# Patient Record
Sex: MALE | Race: WHITE | Employment: FULL TIME | URBAN - METROPOLITAN AREA
[De-identification: names, ages, dates, MRNs, and addresses within clinical notes are randomized per-mention and may not be internally consistent; named-entity substitution may affect disease eponyms.]

---

## 2019-01-16 ENCOUNTER — HOSPITAL ENCOUNTER (EMERGENCY)
Age: 49
Discharge: HOME | End: 2019-01-16
Payer: COMMERCIAL

## 2019-01-16 ENCOUNTER — APPOINTMENT (OUTPATIENT)
Dept: GENERAL RADIOLOGY | Age: 49
End: 2019-01-16
Payer: COMMERCIAL

## 2019-01-16 VITALS
WEIGHT: 192 LBS | OXYGEN SATURATION: 96 % | DIASTOLIC BLOOD PRESSURE: 66 MMHG | HEART RATE: 64 BPM | BODY MASS INDEX: 30.13 KG/M2 | TEMPERATURE: 98.1 F | RESPIRATION RATE: 14 BRPM | SYSTOLIC BLOOD PRESSURE: 127 MMHG | HEIGHT: 67 IN

## 2019-01-16 DIAGNOSIS — S40.021A ARM CONTUSION, RIGHT, INITIAL ENCOUNTER: Primary | ICD-10-CM

## 2019-01-16 PROCEDURE — 73090 X-RAY EXAM OF FOREARM: CPT

## 2019-01-16 PROCEDURE — 73110 X-RAY EXAM OF WRIST: CPT

## 2019-01-16 PROCEDURE — 99283 EMERGENCY DEPT VISIT LOW MDM: CPT

## 2019-01-16 RX ORDER — IBUPROFEN 800 MG/1
800 TABLET ORAL EVERY 8 HOURS PRN
Qty: 12 TABLET | Refills: 0 | Status: SHIPPED | OUTPATIENT
Start: 2019-01-16

## 2019-01-16 ASSESSMENT — PAIN DESCRIPTION - ORIENTATION: ORIENTATION: RIGHT

## 2019-01-16 ASSESSMENT — PAIN DESCRIPTION - PROGRESSION: CLINICAL_PROGRESSION: NOT CHANGED

## 2019-01-16 ASSESSMENT — PAIN DESCRIPTION - FREQUENCY: FREQUENCY: CONTINUOUS

## 2019-01-16 ASSESSMENT — PAIN DESCRIPTION - DESCRIPTORS: DESCRIPTORS: PATIENT UNABLE TO DESCRIBE

## 2019-01-16 ASSESSMENT — PAIN SCALES - GENERAL: PAINLEVEL_OUTOF10: 5

## 2019-01-16 ASSESSMENT — PAIN DESCRIPTION - LOCATION: LOCATION: ARM

## 2019-01-16 ASSESSMENT — PAIN DESCRIPTION - PAIN TYPE: TYPE: ACUTE PAIN

## 2022-12-05 ENCOUNTER — HOSPITAL ENCOUNTER (INPATIENT)
Age: 52
LOS: 4 days | Discharge: HOME | DRG: 513 | End: 2022-12-09
Attending: EMERGENCY MEDICINE | Admitting: INTERNAL MEDICINE
Payer: COMMERCIAL

## 2022-12-05 ENCOUNTER — APPOINTMENT (OUTPATIENT)
Dept: GENERAL RADIOLOGY | Age: 52
DRG: 513 | End: 2022-12-05
Payer: COMMERCIAL

## 2022-12-05 DIAGNOSIS — S61.452A DOG BITE OF LEFT HAND, INITIAL ENCOUNTER: ICD-10-CM

## 2022-12-05 DIAGNOSIS — S62.329B: ICD-10-CM
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
## 2022-12-05 DIAGNOSIS — L03.114 CELLULITIS OF LEFT UPPER EXTREMITY: ICD-10-CM

## 2022-12-05 DIAGNOSIS — L03.114 CELLULITIS OF LEFT HAND: ICD-10-CM

## 2022-12-05 PROBLEM — L03.90 CELLULITIS: Status: ACTIVE | Noted: 2022-12-05

## 2022-12-05 LAB
ANION GAP SERPL CALCULATED.3IONS-SCNC: 8 MMOL/L (ref 7–16)
BASOPHILS ABSOLUTE: 0.04 E9/L (ref 0–0.2)
BASOPHILS RELATIVE PERCENT: 0.3 % (ref 0–2)
BUN BLDV-MCNC: 17 MG/DL (ref 6–20)
CALCIUM SERPL-MCNC: 9.5 MG/DL (ref 8.6–10.2)
CHLORIDE BLD-SCNC: 103 MMOL/L (ref 98–107)
CO2: 28 MMOL/L (ref 22–29)
CREAT SERPL-MCNC: 1 MG/DL (ref 0.7–1.2)
EOSINOPHILS ABSOLUTE: 0.02 E9/L (ref 0.05–0.5)
EOSINOPHILS RELATIVE PERCENT: 0.2 % (ref 0–6)
GFR SERPL CREATININE-BSD FRML MDRD: >60 ML/MIN/1.73
GLUCOSE BLD-MCNC: 123 MG/DL (ref 74–99)
HCT VFR BLD CALC: 41.5 % (ref 37–54)
HEMOGLOBIN: 14.7 G/DL (ref 12.5–16.5)
IMMATURE GRANULOCYTES #: 0.05 E9/L
IMMATURE GRANULOCYTES %: 0.4 % (ref 0–5)
LYMPHOCYTES ABSOLUTE: 1.99 E9/L (ref 1.5–4)
LYMPHOCYTES RELATIVE PERCENT: 15.1 % (ref 20–42)
MCH RBC QN AUTO: 31.4 PG (ref 26–35)
MCHC RBC AUTO-ENTMCNC: 35.4 % (ref 32–34.5)
MCV RBC AUTO: 88.7 FL (ref 80–99.9)
MONOCYTES ABSOLUTE: 1.24 E9/L (ref 0.1–0.95)
MONOCYTES RELATIVE PERCENT: 9.4 % (ref 2–12)
NEUTROPHILS ABSOLUTE: 9.88 E9/L (ref 1.8–7.3)
NEUTROPHILS RELATIVE PERCENT: 74.6 % (ref 43–80)
PDW BLD-RTO: 11.9 FL (ref 11.5–15)
PLATELET # BLD: 292 E9/L (ref 130–450)
PMV BLD AUTO: 9.1 FL (ref 7–12)
POTASSIUM SERPL-SCNC: 4.1 MMOL/L (ref 3.5–5)
RBC # BLD: 4.68 E12/L (ref 3.8–5.8)
SODIUM BLD-SCNC: 139 MMOL/L (ref 132–146)
WBC # BLD: 13.2 E9/L (ref 4.5–11.5)

## 2022-12-05 PROCEDURE — 1200000000 HC SEMI PRIVATE

## 2022-12-05 PROCEDURE — 2580000003 HC RX 258: Performed by: FAMILY MEDICINE

## 2022-12-05 PROCEDURE — 73130 X-RAY EXAM OF HAND: CPT

## 2022-12-05 PROCEDURE — 90471 IMMUNIZATION ADMIN: CPT | Performed by: NURSE PRACTITIONER

## 2022-12-05 PROCEDURE — 80048 BASIC METABOLIC PNL TOTAL CA: CPT

## 2022-12-05 PROCEDURE — 90714 TD VACC NO PRESV 7 YRS+ IM: CPT | Performed by: NURSE PRACTITIONER

## 2022-12-05 PROCEDURE — 99285 EMERGENCY DEPT VISIT HI MDM: CPT

## 2022-12-05 PROCEDURE — 6360000002 HC RX W HCPCS: Performed by: NURSE PRACTITIONER

## 2022-12-05 PROCEDURE — 2580000003 HC RX 258: Performed by: NURSE PRACTITIONER

## 2022-12-05 PROCEDURE — 85025 COMPLETE CBC W/AUTO DIFF WBC: CPT

## 2022-12-05 PROCEDURE — 6360000002 HC RX W HCPCS: Performed by: FAMILY MEDICINE

## 2022-12-05 PROCEDURE — 96365 THER/PROPH/DIAG IV INF INIT: CPT

## 2022-12-05 PROCEDURE — 96375 TX/PRO/DX INJ NEW DRUG ADDON: CPT

## 2022-12-05 RX ORDER — ENOXAPARIN SODIUM 100 MG/ML
40 INJECTION SUBCUTANEOUS DAILY
Status: DISCONTINUED | OUTPATIENT
Start: 2022-12-05 | End: 2022-12-09 | Stop reason: HOSPADM

## 2022-12-05 RX ORDER — ACETAMINOPHEN 650 MG
TABLET, EXTENDED RELEASE ORAL ONCE
Status: COMPLETED | OUTPATIENT
Start: 2022-12-05 | End: 2022-12-05

## 2022-12-05 RX ORDER — ACETAMINOPHEN 650 MG/1
650 SUPPOSITORY RECTAL EVERY 6 HOURS PRN
Status: DISCONTINUED | OUTPATIENT
Start: 2022-12-05 | End: 2022-12-09 | Stop reason: HOSPADM

## 2022-12-05 RX ORDER — SODIUM CHLORIDE 9 MG/ML
INJECTION, SOLUTION INTRAVENOUS PRN
Status: DISCONTINUED | OUTPATIENT
Start: 2022-12-05 | End: 2022-12-08 | Stop reason: SDUPTHER

## 2022-12-05 RX ORDER — KETOROLAC TROMETHAMINE 30 MG/ML
15 INJECTION, SOLUTION INTRAMUSCULAR; INTRAVENOUS ONCE
Status: COMPLETED | OUTPATIENT
Start: 2022-12-05 | End: 2022-12-05

## 2022-12-05 RX ORDER — SODIUM CHLORIDE 9 MG/ML
INJECTION, SOLUTION INTRAVENOUS CONTINUOUS
Status: DISCONTINUED | OUTPATIENT
Start: 2022-12-05 | End: 2022-12-08

## 2022-12-05 RX ORDER — ONDANSETRON 4 MG/1
4 TABLET, ORALLY DISINTEGRATING ORAL EVERY 8 HOURS PRN
Status: DISCONTINUED | OUTPATIENT
Start: 2022-12-05 | End: 2022-12-09 | Stop reason: HOSPADM

## 2022-12-05 RX ORDER — ACETAMINOPHEN 325 MG/1
650 TABLET ORAL EVERY 6 HOURS PRN
Status: DISCONTINUED | OUTPATIENT
Start: 2022-12-05 | End: 2022-12-09 | Stop reason: HOSPADM

## 2022-12-05 RX ORDER — ONDANSETRON 2 MG/ML
4 INJECTION INTRAMUSCULAR; INTRAVENOUS EVERY 6 HOURS PRN
Status: DISCONTINUED | OUTPATIENT
Start: 2022-12-05 | End: 2022-12-09 | Stop reason: HOSPADM

## 2022-12-05 RX ORDER — IBUPROFEN 200 MG
200 TABLET ORAL EVERY 6 HOURS PRN
COMMUNITY

## 2022-12-05 RX ORDER — IBUPROFEN 800 MG/1
800 TABLET ORAL EVERY 8 HOURS PRN
Status: DISCONTINUED | OUTPATIENT
Start: 2022-12-05 | End: 2022-12-09 | Stop reason: HOSPADM

## 2022-12-05 RX ORDER — SODIUM CHLORIDE 0.9 % (FLUSH) 0.9 %
10 SYRINGE (ML) INJECTION EVERY 12 HOURS SCHEDULED
Status: DISCONTINUED | OUTPATIENT
Start: 2022-12-05 | End: 2022-12-09 | Stop reason: HOSPADM

## 2022-12-05 RX ORDER — TETANUS AND DIPHTHERIA TOXOIDS ADSORBED 2; 2 [LF]/.5ML; [LF]/.5ML
0.5 INJECTION INTRAMUSCULAR ONCE
Status: COMPLETED | OUTPATIENT
Start: 2022-12-05 | End: 2022-12-05

## 2022-12-05 RX ORDER — SODIUM CHLORIDE 0.9 % (FLUSH) 0.9 %
10 SYRINGE (ML) INJECTION PRN
Status: DISCONTINUED | OUTPATIENT
Start: 2022-12-05 | End: 2022-12-09 | Stop reason: HOSPADM

## 2022-12-05 RX ADMIN — SODIUM CHLORIDE 3000 MG: 900 INJECTION INTRAVENOUS at 20:30

## 2022-12-05 RX ADMIN — AMPICILLIN AND SULBACTAM 3000 MG: 1; 2 INJECTION, POWDER, FOR SOLUTION INTRAMUSCULAR; INTRAVENOUS at 13:24

## 2022-12-05 RX ADMIN — KETOROLAC TROMETHAMINE 15 MG: 30 INJECTION, SOLUTION INTRAMUSCULAR; INTRAVENOUS at 13:12

## 2022-12-05 RX ADMIN — SODIUM CHLORIDE: 9 INJECTION, SOLUTION INTRAVENOUS at 18:16

## 2022-12-05 RX ADMIN — SODIUM CHLORIDE, PRESERVATIVE FREE 10 ML: 5 INJECTION INTRAVENOUS at 20:48

## 2022-12-05 RX ADMIN — Medication: at 13:11

## 2022-12-05 RX ADMIN — TETANUS AND DIPHTHERIA TOXOIDS ADSORBED 0.5 ML: 2; 2 INJECTION INTRAMUSCULAR at 14:04

## 2022-12-05 ASSESSMENT — PAIN SCALES - GENERAL
PAINLEVEL_OUTOF10: 4
PAINLEVEL_OUTOF10: 0

## 2022-12-05 ASSESSMENT — PAIN - FUNCTIONAL ASSESSMENT: PAIN_FUNCTIONAL_ASSESSMENT: 0-10

## 2022-12-05 NOTE — ED PROVIDER NOTES
ED Attending shared visit  CC: No       Department of Emergency Medicine   ED  Encounter Note  Admit Date/RoomTime: 2022 12:42 PM  ED Room: 36/36  NAME: Lizett Louise  : 1970  MRN: [id number]     Chief Complaint:  Animal Bite (Left hand on Saturday. Not owner of dog, seen at urgent care and sent in for IV abx.)    HISTORY OF PRESENT ILLNESS        Lizett Louise is a 50 y.o. male who presents to the ED arrived via private vehicle with significant other for injury sustained to the left hand after being by dog 2 days ago. The owner is known and Department of Health has been contacted. Dog is up-to-date on rabies. Patient is unaware of his last tetanus. He has no history of diabetes. He has developed progressively worsening swelling and erythema of the left hand. He holds his fingers in a flexed position. The redness and edema is extending to the distal portion of the forearm. ROS   Pertinent positives and negatives are stated within HPI, all other systems reviewed and are negative. Past Medical History:  has no past medical history on file. Surgical History:  has a past surgical history that includes Biceps tendon repair. Social History:  reports that he has never smoked. He has never used smokeless tobacco. He reports current alcohol use. He reports that he does not use drugs. Family History: family history is not on file. Allergies: Patient has no known allergies. PHYSICAL EXAM   Oxygen Saturation Interpretation: Normal on room air analysis. ED Triage Vitals   BP Temp Temp src Heart Rate Resp SpO2 Height Weight   22 1222 22 1222 -- 22 1139 22 1139 22 1139 22 1139 22 1139   (!) 153/93 99 °F (37.2 °C)  (!) 108 18 99 % 5' 7\" (1.702 m) 192 lb (87.1 kg)         Physical Exam  General: Awake alert and oriented. Well-appearing. Nontoxic. HEENT: Normocephalic, atraumatic.   Pupils equal  Neck: Normal range of motion  Cardiac: Regular rate  Respiratory: Respirations even, unlabored. No respiratory distress  Abdomen: Nondistended  Musculoskelatal: Specifically the left hand is grossly edematous in all of the fingers and the dorsum with associated erythema. Erythema and edema extending to the distal forearm. He is holding his fingers in a flexed position unable to straighten. He is a puncture wound over the ulnar aspect of the third metacarpal head as well as 1 to the dorsum of the hand.   Neuro: Nonlateralizing  Skin: Flesh tone, warm, dry  Psych: Normal affect  Lab / Imaging Results   (All laboratory and radiology results have been personally reviewed by myself)  Labs:  Results for orders placed or performed during the hospital encounter of 12/05/22   CBC with Auto Differential   Result Value Ref Range    WBC 13.2 (H) 4.5 - 11.5 E9/L    RBC 4.68 3.80 - 5.80 E12/L    Hemoglobin 14.7 12.5 - 16.5 g/dL    Hematocrit 41.5 37.0 - 54.0 %    MCV 88.7 80.0 - 99.9 fL    MCH 31.4 26.0 - 35.0 pg    MCHC 35.4 (H) 32.0 - 34.5 %    RDW 11.9 11.5 - 15.0 fL    Platelets 407 331 - 354 E9/L    MPV 9.1 7.0 - 12.0 fL    Neutrophils % 74.6 43.0 - 80.0 %    Immature Granulocytes % 0.4 0.0 - 5.0 %    Lymphocytes % 15.1 (L) 20.0 - 42.0 %    Monocytes % 9.4 2.0 - 12.0 %    Eosinophils % 0.2 0.0 - 6.0 %    Basophils % 0.3 0.0 - 2.0 %    Neutrophils Absolute 9.88 (H) 1.80 - 7.30 E9/L    Immature Granulocytes # 0.05 E9/L    Lymphocytes Absolute 1.99 1.50 - 4.00 E9/L    Monocytes Absolute 1.24 (H) 0.10 - 0.95 E9/L    Eosinophils Absolute 0.02 (L) 0.05 - 0.50 E9/L    Basophils Absolute 0.04 0.00 - 0.20 T5/O   Basic Metabolic Panel   Result Value Ref Range    Sodium 139 132 - 146 mmol/L    Potassium 4.1 3.5 - 5.0 mmol/L    Chloride 103 98 - 107 mmol/L    CO2 28 22 - 29 mmol/L    Anion Gap 8 7 - 16 mmol/L    Glucose 123 (H) 74 - 99 mg/dL    BUN 17 6 - 20 mg/dL    Creatinine 1.0 0.7 - 1.2 mg/dL    Est, Glom Filt Rate >60 >=60 mL/min/1.73    Calcium 9.5 8.6 - 10.2 mg/dL Imaging: All Radiology results interpreted by Radiologist unless otherwise noted. XR HAND LEFT (MIN 3 VIEWS)   Final Result   Small avulsion fragment adjacent to the 3rd metacarpal head. Soft tissue   swelling. Osteoarthritis. ED Course / Medical Decision Making     Medications   ampicillin-sulbactam (UNASYN) 3,000 mg in sodium chloride 0.9 % 100 mL IVPB (mini-bag) (0 mg IntraVENous Stopped 12/5/22 1429)   diptheria-tetanus toxoids (DECAVAC) 2-2 LF/0.5ML injection 0.5 mL (0.5 mLs IntraMUSCular Given 12/5/22 1404)   povidone-iodine (BETADINE) 10 % external solution ( Topical Given 12/5/22 1311)   ketorolac (TORADOL) injection 15 mg (15 mg IntraVENous Given 12/5/22 1312)     ED Course as of 12/05/22 1447   Mon Dec 05, 2022   1442 Discussed with Jazmin Red of [facility], will admit to Dr. Jann Gosselin [LV]      ED Course User Index  [LV] VITA Wilson CNP     Re-examination:  12/5/22       Time: 1430  Patients condition remains stable. Consult(s):   IP CONSULT TO HOSPITALIST    Procedure(s):   None    MDM:   74-year-old male patient presenting to the emergency department with a puncture wound sustained from a dog bite approximately 48 hours ago. It does appear to be cellulitic. There is an avulsion fracture on the x-ray. This will require inpatient IV antibiotics. Plan of Care/Counseling:  VITA Wilson CNP reviewed today's visit with the patient in addition to providing specific details for the plan of care and counseling regarding the diagnosis and prognosis. Questions are answered at this time and are agreeable with the plan. ASSESSMENT     1. Dog bite, initial encounter    2. Cellulitis of left upper extremity    3. Open avulsion fracture of shaft of metacarpal bone, initial encounter      PLAN   Admit to General Medical Floor.   Patient condition is stable    New Medications     New Prescriptions    No medications on file     Electronically signed by VITA Wilson CNP   DD: 12/5/22  **This report was transcribed using voice recognition software. Every effort was made to ensure accuracy; however, inadvertent computerized transcription errors may be present.   END OF ED PROVIDER NOTE      Jorge Khalil, VITA - BLANKA  12/05/22 [id number]

## 2022-12-06 ENCOUNTER — ANESTHESIA EVENT (OUTPATIENT)
Dept: OPERATING ROOM | Age: 52
End: 2022-12-06
Payer: COMMERCIAL

## 2022-12-06 PROBLEM — S61.452A DOG BITE OF LEFT HAND: Status: ACTIVE | Noted: 2022-12-03
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
## 2022-12-06 LAB
ANION GAP SERPL CALCULATED.3IONS-SCNC: 10 MMOL/L (ref 7–16)
BASOPHILS ABSOLUTE: 0.02 E9/L (ref 0–0.2)
BASOPHILS RELATIVE PERCENT: 0.2 % (ref 0–2)
BUN BLDV-MCNC: 16 MG/DL (ref 6–20)
CALCIUM SERPL-MCNC: 8.5 MG/DL (ref 8.6–10.2)
CHLORIDE BLD-SCNC: 102 MMOL/L (ref 98–107)
CO2: 23 MMOL/L (ref 22–29)
CREAT SERPL-MCNC: 1 MG/DL (ref 0.7–1.2)
EOSINOPHILS ABSOLUTE: 0.03 E9/L (ref 0.05–0.5)
EOSINOPHILS RELATIVE PERCENT: 0.3 % (ref 0–6)
GFR SERPL CREATININE-BSD FRML MDRD: >60 ML/MIN/1.73
GLUCOSE BLD-MCNC: 113 MG/DL (ref 74–99)
HCT VFR BLD CALC: 37.1 % (ref 37–54)
HEMOGLOBIN: 12.9 G/DL (ref 12.5–16.5)
IMMATURE GRANULOCYTES #: 0.02 E9/L
IMMATURE GRANULOCYTES %: 0.2 % (ref 0–5)
LACTIC ACID: 0.7 MMOL/L (ref 0.5–2.2)
LYMPHOCYTES ABSOLUTE: 1.5 E9/L (ref 1.5–4)
LYMPHOCYTES RELATIVE PERCENT: 15.6 % (ref 20–42)
MCH RBC QN AUTO: 30.8 PG (ref 26–35)
MCHC RBC AUTO-ENTMCNC: 34.8 % (ref 32–34.5)
MCV RBC AUTO: 88.5 FL (ref 80–99.9)
MONOCYTES ABSOLUTE: 0.84 E9/L (ref 0.1–0.95)
MONOCYTES RELATIVE PERCENT: 8.8 % (ref 2–12)
NEUTROPHILS ABSOLUTE: 7.19 E9/L (ref 1.8–7.3)
NEUTROPHILS RELATIVE PERCENT: 74.9 % (ref 43–80)
PDW BLD-RTO: 11.7 FL (ref 11.5–15)
PLATELET # BLD: 267 E9/L (ref 130–450)
PMV BLD AUTO: 9.1 FL (ref 7–12)
POTASSIUM REFLEX MAGNESIUM: 3.6 MMOL/L (ref 3.5–5)
PROCALCITONIN: 0.11 NG/ML (ref 0–0.08)
RBC # BLD: 4.19 E12/L (ref 3.8–5.8)
SEDIMENTATION RATE, ERYTHROCYTE: 24 MM/HR (ref 0–15)
SODIUM BLD-SCNC: 135 MMOL/L (ref 132–146)
WBC # BLD: 9.6 E9/L (ref 4.5–11.5)

## 2022-12-06 PROCEDURE — 1200000000 HC SEMI PRIVATE

## 2022-12-06 PROCEDURE — 6370000000 HC RX 637 (ALT 250 FOR IP): Performed by: INTERNAL MEDICINE

## 2022-12-06 PROCEDURE — 84145 PROCALCITONIN (PCT): CPT

## 2022-12-06 PROCEDURE — 99254 IP/OBS CNSLTJ NEW/EST MOD 60: CPT | Performed by: ORTHOPAEDIC SURGERY

## 2022-12-06 PROCEDURE — 85651 RBC SED RATE NONAUTOMATED: CPT

## 2022-12-06 PROCEDURE — 83605 ASSAY OF LACTIC ACID: CPT

## 2022-12-06 PROCEDURE — 85025 COMPLETE CBC W/AUTO DIFF WBC: CPT

## 2022-12-06 PROCEDURE — 2580000003 HC RX 258: Performed by: FAMILY MEDICINE

## 2022-12-06 PROCEDURE — 84100 ASSAY OF PHOSPHORUS: CPT

## 2022-12-06 PROCEDURE — 86140 C-REACTIVE PROTEIN: CPT

## 2022-12-06 PROCEDURE — 80048 BASIC METABOLIC PNL TOTAL CA: CPT

## 2022-12-06 PROCEDURE — 83735 ASSAY OF MAGNESIUM: CPT

## 2022-12-06 PROCEDURE — 36415 COLL VENOUS BLD VENIPUNCTURE: CPT

## 2022-12-06 PROCEDURE — 6360000002 HC RX W HCPCS: Performed by: FAMILY MEDICINE

## 2022-12-06 RX ORDER — PANTOPRAZOLE SODIUM 40 MG/1
40 TABLET, DELAYED RELEASE ORAL
Status: DISCONTINUED | OUTPATIENT
Start: 2022-12-07 | End: 2022-12-09 | Stop reason: HOSPADM

## 2022-12-06 RX ORDER — HYDROCODONE BITARTRATE AND ACETAMINOPHEN 5; 325 MG/1; MG/1
1 TABLET ORAL EVERY 4 HOURS PRN
Status: DISCONTINUED | OUTPATIENT
Start: 2022-12-06 | End: 2022-12-07

## 2022-12-06 RX ORDER — POTASSIUM CHLORIDE 7.45 MG/ML
10 INJECTION INTRAVENOUS PRN
Status: DISCONTINUED | OUTPATIENT
Start: 2022-12-06 | End: 2022-12-09 | Stop reason: HOSPADM

## 2022-12-06 RX ORDER — POTASSIUM CHLORIDE 20 MEQ/1
40 TABLET, EXTENDED RELEASE ORAL PRN
Status: DISCONTINUED | OUTPATIENT
Start: 2022-12-06 | End: 2022-12-09 | Stop reason: HOSPADM

## 2022-12-06 RX ADMIN — SODIUM CHLORIDE 3000 MG: 900 INJECTION INTRAVENOUS at 02:28

## 2022-12-06 RX ADMIN — SODIUM CHLORIDE 3000 MG: 900 INJECTION INTRAVENOUS at 14:00

## 2022-12-06 RX ADMIN — SODIUM CHLORIDE: 9 INJECTION, SOLUTION INTRAVENOUS at 08:31

## 2022-12-06 RX ADMIN — SODIUM CHLORIDE 3000 MG: 900 INJECTION INTRAVENOUS at 08:34

## 2022-12-06 RX ADMIN — HYDROCODONE BITARTRATE AND ACETAMINOPHEN 1 TABLET: 5; 325 TABLET ORAL at 20:15

## 2022-12-06 RX ADMIN — SODIUM CHLORIDE 3000 MG: 900 INJECTION INTRAVENOUS at 20:14

## 2022-12-06 ASSESSMENT — PAIN SCALES - GENERAL
PAINLEVEL_OUTOF10: 0
PAINLEVEL_OUTOF10: 5
PAINLEVEL_OUTOF10: 5

## 2022-12-06 ASSESSMENT — PAIN DESCRIPTION - LOCATION: LOCATION: HAND

## 2022-12-06 ASSESSMENT — LIFESTYLE VARIABLES: SMOKING_STATUS: 1

## 2022-12-06 ASSESSMENT — PAIN - FUNCTIONAL ASSESSMENT: PAIN_FUNCTIONAL_ASSESSMENT: ACTIVITIES ARE NOT PREVENTED

## 2022-12-06 ASSESSMENT — PAIN DESCRIPTION - DESCRIPTORS: DESCRIPTORS: ACHING;DISCOMFORT;SORE

## 2022-12-06 ASSESSMENT — PAIN DESCRIPTION - ORIENTATION: ORIENTATION: LEFT

## 2022-12-06 NOTE — CONSULTS
[address] [place] [address] [facility]    Consultation Note     Admit Date: 12/5/2022 12:42 PM    Reason for Consult:   dog bite    Attending Physician:  Jerrod Hidalgo DO     Chief Complaint: dog bite. HISTORY OF PRESENT ILLNESS:   The patient is a 50 y.o.  male known to the Infectious Diseases service. The patient admitted with dog bite of left hand 2 days ago. And he developed progressive redness and swelling. Had chills and fevers so he came to the hospital. No cough or SOB or diarrhea or abdominal pain. He does smoke cigars and drink alcohol. He is  for 15 yrs. Admission patient is afebrile hemodynamically stable saturating well on room air. Labs showed he had leukocytosis yesterday of 13.2 improved 9.6 today otherwise hemoglobin and platelet counts are normal his renal function tests are normal procalcitonin is 0.11. ESR is 24. X-ray left hand showed  Impression   Small avulsion fragment adjacent to the 3rd metacarpal head. Soft tissue   swelling. Osteoarthritis. No blood cultures were drawn patient is on IV Unasyn 3 g every 6 and I got consult further recommendations.     Past Medical History:        Diagnosis Date    Cellulitis 12/05/2022    Dog bite of left hand 12/03/2022     Past Surgical History:        Procedure Laterality Date    BICEPS TENDON REPAIR       Current Medications:   Scheduled Meds:   [START ON 12/7/2022] pantoprazole  40 mg Oral QAM AC    sodium chloride flush  10 mL IntraVENous 2 times per day    enoxaparin  40 mg SubCUTAneous Daily    ampicillin-sulbactam  3,000 mg IntraVENous Q6H     Continuous Infusions:   sodium chloride 75 mL/hr at 12/06/22 0831    sodium chloride       PRN Meds:HYDROcodone 5 mg - acetaminophen, potassium chloride **OR** potassium alternative oral replacement **OR** potassium chloride, ibuprofen, sodium chloride flush, sodium chloride, ondansetron **OR** ondansetron, magnesium hydroxide, acetaminophen **OR** acetaminophen    Allergies:  Patient has no known allergies. Social History:   Social History     Socioeconomic History    Marital status:      Spouse name: None    Number of children: None    Years of education: None    Highest education level: None   Tobacco Use    Smoking status: Some Days     Types: Cigars    Smokeless tobacco: Never   Vaping Use    Vaping Use: Never used   Substance and Sexual Activity    Alcohol use: Yes    Drug use: No     Family History:       Problem Relation Age of Onset    Other Mother          age 59 cerebral aneurysm    Other Father         Alive , severe arthritis prior alcoholic   . Otherwise non-pertinent to the chief complaint. REVIEW OF SYSTEMS:    As mentioned in HPI, all other systems negative. PHYSICAL EXAM:    Vitals:    /68   Pulse 92   Temp 99.7 °F (37.6 °C) (Oral)   Resp 16   Ht 5' 7\" (1.702 m)   Wt 194 lb (88 kg)   SpO2 94%   BMI 30.38 kg/m²   Constitutional: The patient is awake, alert, and oriented. Skin: Warm and dry. No rashes were noted. No jaundice. HEENT: Eyes show round, and reactive pupils. Moist mucous membranes, no ulcerations, no thrush. Neck: Supple to movements. No lymphadenopathy. Chest: No use of accessory muscles to breathe. Symmetrical expansion. Auscultation reveals no wheezing, crackles, or rhonchi. Cardiovascular: S1 and S2 are rhythmic and regular. No murmurs appreciated. Abdomen: Positive bowel sounds to auscultation. Benign to palpation. No masses felt. No hepatosplenomegaly. Extremities: No clubbing, no cyanosis, no edema. Musculoskeletal: left hand cellulitis and swelling till almost to the elbow. Swelling and purulent drainage most pronounced at the left 3rd MTP joint.    Neurological: alert, oriented x 3  Lines: peripheral      CBC+dif:  Recent Labs     22  1256 22  0230   WBC 13.2* 9.6   HGB 14.7 12.9   HCT 41.5 37.1   MCV 88.7 88.5    267   NEUTROABS 9.88* 7.19     No results found for: CRP  No results found for: CRPHS  Lab Results   Component Value Date    SEDRATE 24 (H) 12/06/2022     No results found for: ALT, AST, GGT, ALKPHOS, BILITOT  Lab Results   Component Value Date/Time     12/06/2022 02:30 AM    K 3.6 12/06/2022 02:30 AM     12/06/2022 02:30 AM    CO2 23 12/06/2022 02:30 AM    BUN 16 12/06/2022 02:30 AM    CREATININE 1.0 12/06/2022 02:30 AM    LABGLOM >60 12/06/2022 02:30 AM    GLUCOSE 113 12/06/2022 02:30 AM    CALCIUM 8.5 12/06/2022 02:30 AM       No results found for: PROTIME, INR    No results found for: TSH    No results found for: NITRITE, COLORU, PHUR, LABCAST, WBCUA, RBCUA, MUCUS, TRICHOMONAS, YEAST, BACTERIA, CLARITYU, SPECGRAV, LEUKOCYTESUR, UROBILINOGEN, BILIRUBINUR, BLOODU, GLUCOSEU, AMORPHOUS    No results found for: YEK4ALB, BEART, K1DBTGRN, PHART, THGBART, OID7VYH, PO2ART, FVS8ZQG  Radiology:  XR HAND LEFT (MIN 3 VIEWS)   Final Result   Small avulsion fragment adjacent to the 3rd metacarpal head. Soft tissue   swelling. Osteoarthritis. Microbiology:  Pending  No results for input(s): BC in the last 72 hours. No results for input(s): ORG in the last 72 hours. No results for input(s): Barnetta Alexx in the last 72 hours. No results for input(s): STREPNEUMAGU in the last 72 hours. No results for input(s): LP1UAG in the last 72 hours. No results for input(s): ASO in the last 72 hours. No results for input(s): CULTRESP in the last 72 hours. Assessment:  Left hand cellulitis from dog bite: pending OR     Plan:    Cont IV Unasyn 3gr q 6  Check cultures  Baseline ESR, CRP  Monitor labs  Will follow with you    Thank you for having us see this patient in consultation. I will be discussing this case with the treating physicians.       Electronically signed by Zach Sanabria MD on 12/6/2022 at 3:22 PM

## 2022-12-06 NOTE — H&P
History and Physical      CHIEF COMPLAINT: Animal bite left hand    History of Present Illness: 53-year-old male patient of Dr. Gladis Bean III I am asked to admit and follow. History obtained from patient, wife was present. Presented to the ED due to pain and swelling of his left hand. He states he had a dog bite that occurred 12/3/2022 to his left hand. The dog owner is known; Department of Health has been contacted. Dog is up-to-date on rabies. Patient developed progressive worsening of swelling and erythema of the left hand, holds fingers in a flexed position. --Past medical history is negative for rheumatic fever scarlet fever polio diphtheria cancer diabetes hypertension  --Patient smokes occasional cigar  --Patient's mother  age 59 cerebral aneurysm; father alive age 68 severe arthritis prior alcoholic  --Denies any previous pneumonia but does get bronchitis every 3 to 4 years. No history of asthma no shortness of breath. Denies any palpitations chest pain. Denies any recurring indigestion heartburn gas blood in stool blood in urine kidney stones kidney infections. Past Medical History:   Diagnosis Date    Cellulitis 2022         Past Surgical History:   Procedure Laterality Date    BICEPS TENDON REPAIR         Medications Prior to Admission:    Medications Prior to Admission: ibuprofen (ADVIL;MOTRIN) 200 MG tablet, Take 200 mg by mouth every 6 hours as needed for Pain    Allergies:    Patient has no known allergies. Social History:    reports that he has never smoked. He has never used smokeless tobacco. He reports current alcohol use. He reports that he does not use drugs. Family History:   family history is not on file.     REVIEW OF SYSTEMS:  As above in the HPI, otherwise negative    PHYSICAL EXAM:    VS: /68   Pulse 92   Temp 99.7 °F (37.6 °C) (Oral)   Resp 16   Ht 5' 7\" (1.702 m)   Wt 194 lb (88 kg)   SpO2 94%   BMI 30.38 kg/m²     General appearance: Alert, Awake, Oriented times 3, no distress  Skin: Warm and dry ; no rashes  Head: Normocephalic. No masses, lesions or tenderness noted  Eyes: Conjunctivae pink, sclera white. PERRL,EOM-I  Ears: External ears normal  Nose/Sinuses: Nares normal. Septum midline. Mucosa normal. No drainage  Oropharynx: Oropharynx clear with no exudate seen  Neck: Supple. No jugular venous distension, lymphadenopathy or thyromegaly Trachea midline  Lungs: Clear to auscultation bilaterally. No rhonchi, crackles or wheezes  Heart: S1 S2  Regular rate and rhythm. No rub, gallop, murmur  Abdomen: Soft, non-tender. BS normal. No masses, organomegaly; no rebound or guarding  Extremities: Surgical dressing on left hand and forearm  Musculoskeletal: Muscular strength appears intact. Neuro:  No focal motor defects ; II-XII grossly intact .  DANIEL equally  Breast: deferred  Rectal: deferred  Genitalia:  deferred    LABS:  CBC:   Lab Results   Component Value Date/Time    WBC 9.6 12/06/2022 02:30 AM    RBC 4.19 12/06/2022 02:30 AM    HGB 12.9 12/06/2022 02:30 AM    HCT 37.1 12/06/2022 02:30 AM    MCV 88.5 12/06/2022 02:30 AM    MCH 30.8 12/06/2022 02:30 AM    MCHC 34.8 12/06/2022 02:30 AM    RDW 11.7 12/06/2022 02:30 AM     12/06/2022 02:30 AM    MPV 9.1 12/06/2022 02:30 AM     CBC with Differential:    Lab Results   Component Value Date/Time    WBC 9.6 12/06/2022 02:30 AM    RBC 4.19 12/06/2022 02:30 AM    HGB 12.9 12/06/2022 02:30 AM    HCT 37.1 12/06/2022 02:30 AM     12/06/2022 02:30 AM    MCV 88.5 12/06/2022 02:30 AM    MCH 30.8 12/06/2022 02:30 AM    MCHC 34.8 12/06/2022 02:30 AM    RDW 11.7 12/06/2022 02:30 AM    LYMPHOPCT 15.6 12/06/2022 02:30 AM    MONOPCT 8.8 12/06/2022 02:30 AM    BASOPCT 0.2 12/06/2022 02:30 AM    MONOSABS 0.84 12/06/2022 02:30 AM    LYMPHSABS 1.50 12/06/2022 02:30 AM    EOSABS 0.03 12/06/2022 02:30 AM    BASOSABS 0.02 12/06/2022 02:30 AM     Hemoglobin/Hematocrit:    Lab Results   Component Value Date/Time    HGB 12.9 12/06/2022 02:30 AM    HCT 37.1 12/06/2022 02:30 AM     CMP:    Lab Results   Component Value Date/Time     12/06/2022 02:30 AM    K 3.6 12/06/2022 02:30 AM     12/06/2022 02:30 AM    CO2 23 12/06/2022 02:30 AM    BUN 16 12/06/2022 02:30 AM    CREATININE 1.0 12/06/2022 02:30 AM    LABGLOM >60 12/06/2022 02:30 AM    GLUCOSE 113 12/06/2022 02:30 AM    CALCIUM 8.5 12/06/2022 02:30 AM     BMP:    Lab Results   Component Value Date/Time     12/06/2022 02:30 AM    K 3.6 12/06/2022 02:30 AM     12/06/2022 02:30 AM    CO2 23 12/06/2022 02:30 AM    BUN 16 12/06/2022 02:30 AM    CREATININE 1.0 12/06/2022 02:30 AM    CALCIUM 8.5 12/06/2022 02:30 AM    LABGLOM >60 12/06/2022 02:30 AM    GLUCOSE 113 12/06/2022 02:30 AM     Hepatic Function Panel:  No results found for: ALKPHOS, ALT, AST, PROT, BILITOT, BILIDIR, IBILI, LABALBU  Ionized Calcium:  No results found for: IONCA  Magnesium:  No results found for: MG  Phosphorus:  No results found for: PHOS  LDH:  No results found for: LDH  Uric Acid:  No results found for: LABURIC, URICACID  PT/INR:  No results found for: PROTIME, INR  U/A:  No results found for: NITRITE, COLORU, PROTEINU, PHUR, LABCAST, WBCUA, RBCUA, MUCUS, TRICHOMONAS, YEAST, BACTERIA, CLARITYU, SPECGRAV, LEUKOCYTESUR, UROBILINOGEN, BILIRUBINUR, BLOODU, GLUCOSEU, AMORPHOUS  HgBA1c:  No results found for: LABA1C  FLP:  No results found for: TRIG, HDL, LDLCALC, LDLDIRECT, LABVLDL  TSH:  No results found for: TSH  VITAMIN B12: No components found for: B12  FOLATE:  No results found for: FOLATE  IRON:  No results found for: IRON  Iron Saturation:  No components found for: PERCENTFE  TIBC:  No results found for: TIBC  FERRITIN:  No results found for: FERRITIN    RADIOLOGY:  XR HAND LEFT (MIN 3 VIEWS)   Final Result   Small avulsion fragment adjacent to the 3rd metacarpal head. Soft tissue   swelling. Osteoarthritis.              ASSESSMENT:      Active Hospital Problems Diagnosis     Cellulitis [L03.90]      Priority: Medium       PLAN:  Medications discussed with patient  GI prophylaxis  DVT prophylaxis  ID will be consulted  Hand surgery will be consulted in view of the fracture/avulsion  Unasyn 3 g IV every 6 hours  Hydrocodone 5 mg, 1 tablet every 4 as needed for severe pain  Ibuprofen every 8 hours  Monitor labs  ASO titer  I can find no record of blood cultures being done            Please note that over 50 minutes was spent in evaluating the patient, review of records and results, discussion with staff/family, etc.    Nuvia Elliott DO    1:27 PM  12/6/2022    Voice recognition software use for dictation

## 2022-12-06 NOTE — CONSULTS
Department of Orthopedic Surgery  [facility] Katarina Maddox MD  Consult      Reason for Consult:  left hand dog bite with infection    Consulting Physician: Dr Mahmood Ayaz:                The patient is a 50 y.o. male who presents with left hand dog bite. He reports 3 days ago he was bitten by an acquaintance's dog. He feels like the dog's tooth hit his left hand knuckle. He self treated this. Over the ensuing next 2 to 3 days had worsening pain and swelling which ultimately brought him into the emergency department. He was admitted and started on Unasyn. He is right-hand dominant. He is an  and works in waste water treatment. .    Past Medical History:        Diagnosis Date    Cellulitis 12/05/2022    Dog bite of left hand 12/03/2022     Past Surgical History:        Procedure Laterality Date    BICEPS TENDON REPAIR       Current Medications:   Current Facility-Administered Medications: [START ON 12/7/2022] pantoprazole (PROTONIX) tablet 40 mg, 40 mg, Oral, QAM AC  HYDROcodone-acetaminophen (NORCO) 5-325 MG per tablet 1 tablet, 1 tablet, Oral, Q4H PRN  potassium chloride (KLOR-CON M) extended release tablet 40 mEq, 40 mEq, Oral, PRN **OR** potassium bicarb-citric acid (EFFER-K) effervescent tablet 40 mEq, 40 mEq, Oral, PRN **OR** potassium chloride 10 mEq/100 mL IVPB (Peripheral Line), 10 mEq, IntraVENous, PRN  ibuprofen (ADVIL;MOTRIN) tablet 800 mg, 800 mg, Oral, Q8H PRN  0.9 % sodium chloride infusion, , IntraVENous, Continuous  sodium chloride flush 0.9 % injection 10 mL, 10 mL, IntraVENous, 2 times per day  sodium chloride flush 0.9 % injection 10 mL, 10 mL, IntraVENous, PRN  0.9 % sodium chloride infusion, , IntraVENous, PRN  enoxaparin (LOVENOX) injection 40 mg, 40 mg, SubCUTAneous, Daily  ondansetron (ZOFRAN-ODT) disintegrating tablet 4 mg, 4 mg, Oral, Q8H PRN **OR** ondansetron (ZOFRAN) injection 4 mg, 4 mg, IntraVENous, Q6H PRN  magnesium hydroxide (MILK OF MAGNESIA) 400 MG/5ML suspension 30 mL, 30 mL, Oral, Daily PRN  acetaminophen (TYLENOL) tablet 650 mg, 650 mg, Oral, Q6H PRN **OR** acetaminophen (TYLENOL) suppository 650 mg, 650 mg, Rectal, Q6H PRN  ampicillin-sulbactam (UNASYN) 3,000 mg in sodium chloride 0.9 % 100 mL IVPB (mini-bag), 3,000 mg, IntraVENous, Q6H  Allergies:  Patient has no known allergies. Social History:   TOBACCO:   reports that he has been smoking cigars. He has never used smokeless tobacco.  ETOH:   reports current alcohol use. DRUGS:   reports no history of drug use. ACTIVITIES OF DAILY LIVING:    OCCUPATION:    Family History:       Problem Relation Age of Onset    Other Mother          age 59 cerebral aneurysm    Other Father         Alive , severe arthritis prior alcoholic       REVIEW OF SYSTEMS:  CONSTITUTIONAL:  negative  EYES:  negative  HEENT:  negative  RESPIRATORY:  negative  CARDIOVASCULAR:  negative  GASTROINTESTINAL:  negative  GENITOURINARY:  negative  INTEGUMENT/BREAST:  negative  HEMATOLOGIC/LYMPHATIC:  negative  ALLERGIC/IMMUNOLOGIC:  negative  ENDOCRINE:  negative  MUSCULOSKELETAL:  Left hand pain/swelling  NEUROLOGICAL:  negative  BEHAVIOR/PSYCH:  negative    PHYSICAL EXAM:    VITALS:  /68   Pulse 92   Temp 99.7 °F (37.6 °C) (Oral)   Resp 16   Ht 5' 7\" (1.702 m)   Wt 194 lb (88 kg)   SpO2 94%   BMI 30.38 kg/m²   CONSTITUTIONAL:  awake, alert, cooperative, no apparent distress, and appears stated age  EYES:  Lids and lashes normal, pupils equal, round and reactive to light, extra ocular muscles intact, sclera clear, conjunctiva normal  ENT:  Normocephalic, without obvious abnormality, atraumatic, sinuses nontender on palpation, external ears without lesions, oral pharynx with moist mucus membranes, tonsils without erythema or exudates, gums normal and good dentition.   NECK:  Supple, symmetrical, trachea midline, no adenopathy, thyroid symmetric, not enlarged and no tenderness, skin normal  NEUROLOGIC:  Awake, alert, oriented to name, place and time. Cranial nerves II-XII are grossly intact. Motor is 5 out of 5 bilaterally. Sensory is intact. gait is normal.  MUSCULOSKELETAL:    Left upper extremity: Negative lymphadenopathy in the axilla or in the supracondylar region. Full shoulder and elbow range of motion. Edema and swelling involving the dorsal greater than palmar aspect of the hand extending up to the mid forearm. Tenderness palpation. There is a puncture wound with serosanguineous drainage from the dorsal aspect of the hand adjacent to the middle finger metacarpophalangeal joint. He is able to extend the index middle ring and small fingers however flexion is limited secondary to pain and discomfort dorsally. Radial, ulnar, median nerves are grossly intact light touch. Is brisk cap refill of digits. 2+ radial pulse    DATA:    CBC:   Lab Results   Component Value Date/Time    WBC 9.6 12/06/2022 02:30 AM    RBC 4.19 12/06/2022 02:30 AM    HGB 12.9 12/06/2022 02:30 AM    HCT 37.1 12/06/2022 02:30 AM    MCV 88.5 12/06/2022 02:30 AM    MCH 30.8 12/06/2022 02:30 AM    MCHC 34.8 12/06/2022 02:30 AM    RDW 11.7 12/06/2022 02:30 AM     12/06/2022 02:30 AM    MPV 9.1 12/06/2022 02:30 AM     PT/INR:  No results found for: PROTIME, INR    ESR 24      Radiology Review: X-rays of the left hand AP lateral obliques were reviewed from 12/5/2022. There is a avulsion type fracture off the third metacarpal head. IMPRESSION:  Left hand dog bite with underlying fracture of metacarpal head and probable septic arthritis of third metacarpophalangeal joint    PLAN:  These findings were explained the patient as well as the patient's wife over the telephone. He does have a dog bite injury with probable septic arthritis about the metacarpophalangeal joint with associated avulsion fracture. Recommended MCP joint excisional debridement. Continue with infectious disease recommendations of Unasyn.   All questions answered  NPO after midnight  Hold anticoagulation    I explained the risks, benefits, alternatives and complications of surgery with the patient including but not limited to the risks of continued or worsening infection, post-infectious arthritis, possible damage to nerves, vessels, or tendons, stiffness, loss of range of motion, scar sensitivity, wound healing complications, worsening symptoms, possible need for therapy, as well as the possible need further surgery and unanticipated complications. The patient voiced understanding and all questions were answered. The patient elected to proceed with surgical intervention.      Israel Guaman MD  12/6/2022

## 2022-12-06 NOTE — PATIENT CARE CONFERENCE
[facility] Quality Flow/Interdisciplinary Rounds Progress Note        Quality Flow Rounds held on December 6, 2022    Disciplines Attending:  Bedside Nurse, , , and Nursing Unit Leadership    Frantz Kemp was admitted on 12/5/2022 12:42 PM    Anticipated Discharge Date:       Disposition:    Roberto Score:  Roberto Scale Score: 22    Readmission Risk              Risk of Unplanned Readmission:  5           Discussed patient goal for the day, patient clinical progression, and barriers to discharge.   The following Goal(s) of the Day/Commitment(s) have been identified:  Diagnostics - Report Results and Labs - Report Results      Eleuterio Bal RN  December 6, 2022

## 2022-12-07 ENCOUNTER — APPOINTMENT (OUTPATIENT)
Dept: GENERAL RADIOLOGY | Age: 52
DRG: 513 | End: 2022-12-07
Payer: COMMERCIAL

## 2022-12-07 ENCOUNTER — ANESTHESIA (OUTPATIENT)
Dept: OPERATING ROOM | Age: 52
End: 2022-12-07
Payer: COMMERCIAL

## 2022-12-07 PROBLEM — M00.9 SEPTIC ARTHRITIS OF HAND, LEFT (HCC): Status: ACTIVE | Noted: 2022-12-07

## 2022-12-07 PROBLEM — S62.318A: Status: ACTIVE | Noted: 2022-12-03

## 2022-12-07 PROBLEM — S62.303A: Status: ACTIVE | Noted: 2022-12-07

## 2022-12-07 LAB
ALBUMIN SERPL-MCNC: 3.4 G/DL (ref 3.5–5.2)
ALP BLD-CCNC: 47 U/L (ref 40–129)
ALT SERPL-CCNC: 12 U/L (ref 0–40)
ANION GAP SERPL CALCULATED.3IONS-SCNC: 7 MMOL/L (ref 7–16)
ANTISTREPTOLYSIN-O: 214 IU/ML (ref 0–200)
AST SERPL-CCNC: 12 U/L (ref 0–39)
BASOPHILS ABSOLUTE: 0.02 E9/L (ref 0–0.2)
BASOPHILS RELATIVE PERCENT: 0.3 % (ref 0–2)
BILIRUB SERPL-MCNC: 0.3 MG/DL (ref 0–1.2)
BILIRUBIN DIRECT: <0.2 MG/DL (ref 0–0.3)
BILIRUBIN, INDIRECT: ABNORMAL MG/DL (ref 0–1)
BUN BLDV-MCNC: 8 MG/DL (ref 6–20)
CALCIUM IONIZED: 1.29 MMOL/L (ref 1.15–1.33)
CALCIUM SERPL-MCNC: 8.6 MG/DL (ref 8.6–10.2)
CHLORIDE BLD-SCNC: 105 MMOL/L (ref 98–107)
CHOLESTEROL, TOTAL: 136 MG/DL (ref 0–199)
CO2: 28 MMOL/L (ref 22–29)
CREAT SERPL-MCNC: 1.1 MG/DL (ref 0.7–1.2)
EOSINOPHILS ABSOLUTE: 0.06 E9/L (ref 0.05–0.5)
EOSINOPHILS RELATIVE PERCENT: 0.8 % (ref 0–6)
FERRITIN: 566 NG/ML
FOLATE: 5.2 NG/ML (ref 4.8–24.2)
GFR SERPL CREATININE-BSD FRML MDRD: >60 ML/MIN/1.73
GLUCOSE BLD-MCNC: 115 MG/DL (ref 74–99)
HBA1C MFR BLD: 5.1 % (ref 4–5.6)
HCT VFR BLD CALC: 35.7 % (ref 37–54)
HDLC SERPL-MCNC: 53 MG/DL
HEMOGLOBIN: 12.5 G/DL (ref 12.5–16.5)
IMMATURE GRANULOCYTES #: 0.02 E9/L
IMMATURE GRANULOCYTES %: 0.3 % (ref 0–5)
IRON SATURATION: 15 % (ref 20–55)
IRON: 23 MCG/DL (ref 59–158)
LACTIC ACID: 0.9 MMOL/L (ref 0.5–2.2)
LDL CHOLESTEROL CALCULATED: 69 MG/DL (ref 0–99)
LYMPHOCYTES ABSOLUTE: 1.98 E9/L (ref 1.5–4)
LYMPHOCYTES RELATIVE PERCENT: 25.6 % (ref 20–42)
MAGNESIUM: 1.8 MG/DL (ref 1.6–2.6)
MCH RBC QN AUTO: 31.1 PG (ref 26–35)
MCHC RBC AUTO-ENTMCNC: 35 % (ref 32–34.5)
MCV RBC AUTO: 88.8 FL (ref 80–99.9)
MONOCYTES ABSOLUTE: 0.99 E9/L (ref 0.1–0.95)
MONOCYTES RELATIVE PERCENT: 12.8 % (ref 2–12)
NEUTROPHILS ABSOLUTE: 4.67 E9/L (ref 1.8–7.3)
NEUTROPHILS RELATIVE PERCENT: 60.2 % (ref 43–80)
PDW BLD-RTO: 11.5 FL (ref 11.5–15)
PHOSPHORUS: 2.5 MG/DL (ref 2.5–4.5)
PLATELET # BLD: 269 E9/L (ref 130–450)
PMV BLD AUTO: 9 FL (ref 7–12)
POTASSIUM SERPL-SCNC: 3.9 MMOL/L (ref 3.5–5)
RBC # BLD: 4.02 E12/L (ref 3.8–5.8)
SEDIMENTATION RATE, ERYTHROCYTE: 32 MM/HR (ref 0–15)
SODIUM BLD-SCNC: 140 MMOL/L (ref 132–146)
TOTAL CK: 140 U/L (ref 20–200)
TOTAL IRON BINDING CAPACITY: 150 MCG/DL (ref 250–450)
TOTAL PROTEIN: 5.9 G/DL (ref 6.4–8.3)
TRIGL SERPL-MCNC: 72 MG/DL (ref 0–149)
TSH SERPL DL<=0.05 MIU/L-ACNC: 2.23 UIU/ML (ref 0.27–4.2)
URIC ACID, SERUM: 4.3 MG/DL (ref 3.4–7)
VITAMIN B-12: 189 PG/ML (ref 211–946)
VLDLC SERPL CALC-MCNC: 14 MG/DL
WBC # BLD: 7.7 E9/L (ref 4.5–11.5)

## 2022-12-07 PROCEDURE — 87205 SMEAR GRAM STAIN: CPT

## 2022-12-07 PROCEDURE — 2500000003 HC RX 250 WO HCPCS

## 2022-12-07 PROCEDURE — 82550 ASSAY OF CK (CPK): CPT

## 2022-12-07 PROCEDURE — 3600000012 HC SURGERY LEVEL 2 ADDTL 15MIN: Performed by: ORTHOPAEDIC SURGERY

## 2022-12-07 PROCEDURE — 11012 DEB SKIN BONE AT FX SITE: CPT | Performed by: ORTHOPAEDIC SURGERY

## 2022-12-07 PROCEDURE — 3700000001 HC ADD 15 MINUTES (ANESTHESIA): Performed by: ORTHOPAEDIC SURGERY

## 2022-12-07 PROCEDURE — 87206 SMEAR FLUORESCENT/ACID STAI: CPT

## 2022-12-07 PROCEDURE — 83550 IRON BINDING TEST: CPT

## 2022-12-07 PROCEDURE — 87102 FUNGUS ISOLATION CULTURE: CPT

## 2022-12-07 PROCEDURE — 2580000003 HC RX 258: Performed by: FAMILY MEDICINE

## 2022-12-07 PROCEDURE — 88304 TISSUE EXAM BY PATHOLOGIST: CPT

## 2022-12-07 PROCEDURE — 87075 CULTR BACTERIA EXCEPT BLOOD: CPT

## 2022-12-07 PROCEDURE — 86060 ANTISTREPTOLYSIN O TITER: CPT

## 2022-12-07 PROCEDURE — 80048 BASIC METABOLIC PNL TOTAL CA: CPT

## 2022-12-07 PROCEDURE — 7100000010 HC PHASE II RECOVERY - FIRST 15 MIN: Performed by: ORTHOPAEDIC SURGERY

## 2022-12-07 PROCEDURE — 3209999900 FLUORO FOR SURGICAL PROCEDURES

## 2022-12-07 PROCEDURE — 85025 COMPLETE CBC W/AUTO DIFF WBC: CPT

## 2022-12-07 PROCEDURE — 84443 ASSAY THYROID STIM HORMONE: CPT

## 2022-12-07 PROCEDURE — 83540 ASSAY OF IRON: CPT

## 2022-12-07 PROCEDURE — 6360000002 HC RX W HCPCS: Performed by: PHYSICIAN ASSISTANT

## 2022-12-07 PROCEDURE — 82330 ASSAY OF CALCIUM: CPT

## 2022-12-07 PROCEDURE — 6370000000 HC RX 637 (ALT 250 FOR IP): Performed by: PHYSICIAN ASSISTANT

## 2022-12-07 PROCEDURE — 80061 LIPID PANEL: CPT

## 2022-12-07 PROCEDURE — 82607 VITAMIN B-12: CPT

## 2022-12-07 PROCEDURE — 7100000011 HC PHASE II RECOVERY - ADDTL 15 MIN: Performed by: ORTHOPAEDIC SURGERY

## 2022-12-07 PROCEDURE — 2500000003 HC RX 250 WO HCPCS: Performed by: ORTHOPAEDIC SURGERY

## 2022-12-07 PROCEDURE — 83605 ASSAY OF LACTIC ACID: CPT

## 2022-12-07 PROCEDURE — 6360000002 HC RX W HCPCS: Performed by: FAMILY MEDICINE

## 2022-12-07 PROCEDURE — 87070 CULTURE OTHR SPECIMN AEROBIC: CPT

## 2022-12-07 PROCEDURE — 1200000000 HC SEMI PRIVATE

## 2022-12-07 PROCEDURE — 88311 DECALCIFY TISSUE: CPT

## 2022-12-07 PROCEDURE — 2580000003 HC RX 258

## 2022-12-07 PROCEDURE — 85651 RBC SED RATE NONAUTOMATED: CPT

## 2022-12-07 PROCEDURE — 87116 MYCOBACTERIA CULTURE: CPT

## 2022-12-07 PROCEDURE — 82746 ASSAY OF FOLIC ACID SERUM: CPT

## 2022-12-07 PROCEDURE — 83036 HEMOGLOBIN GLYCOSYLATED A1C: CPT

## 2022-12-07 PROCEDURE — 3600000002 HC SURGERY LEVEL 2 BASE: Performed by: ORTHOPAEDIC SURGERY

## 2022-12-07 PROCEDURE — 84550 ASSAY OF BLOOD/URIC ACID: CPT

## 2022-12-07 PROCEDURE — 6360000002 HC RX W HCPCS

## 2022-12-07 PROCEDURE — 82728 ASSAY OF FERRITIN: CPT

## 2022-12-07 PROCEDURE — 80076 HEPATIC FUNCTION PANEL: CPT

## 2022-12-07 PROCEDURE — 3700000000 HC ANESTHESIA ATTENDED CARE: Performed by: ORTHOPAEDIC SURGERY

## 2022-12-07 PROCEDURE — 2709999900 HC NON-CHARGEABLE SUPPLY: Performed by: ORTHOPAEDIC SURGERY

## 2022-12-07 PROCEDURE — 36415 COLL VENOUS BLD VENIPUNCTURE: CPT

## 2022-12-07 PROCEDURE — 2580000003 HC RX 258: Performed by: PHYSICIAN ASSISTANT

## 2022-12-07 PROCEDURE — 0PBQ0ZZ EXCISION OF LEFT METACARPAL, OPEN APPROACH: ICD-10-PCS | Performed by: ORTHOPAEDIC SURGERY

## 2022-12-07 RX ORDER — SODIUM CHLORIDE 9 MG/ML
INJECTION, SOLUTION INTRAVENOUS PRN
Status: DISCONTINUED | OUTPATIENT
Start: 2022-12-07 | End: 2022-12-07 | Stop reason: HOSPADM

## 2022-12-07 RX ORDER — MORPHINE SULFATE 2 MG/ML
2 INJECTION, SOLUTION INTRAMUSCULAR; INTRAVENOUS EVERY 5 MIN PRN
Status: DISCONTINUED | OUTPATIENT
Start: 2022-12-07 | End: 2022-12-07 | Stop reason: HOSPADM

## 2022-12-07 RX ORDER — SODIUM CHLORIDE 9 MG/ML
INJECTION, SOLUTION INTRAVENOUS CONTINUOUS PRN
Status: DISCONTINUED | OUTPATIENT
Start: 2022-12-07 | End: 2022-12-07 | Stop reason: SDUPTHER

## 2022-12-07 RX ORDER — SODIUM CHLORIDE 0.9 % (FLUSH) 0.9 %
5-40 SYRINGE (ML) INJECTION PRN
Status: DISCONTINUED | OUTPATIENT
Start: 2022-12-07 | End: 2022-12-07 | Stop reason: HOSPADM

## 2022-12-07 RX ORDER — PROPOFOL 10 MG/ML
INJECTION, EMULSION INTRAVENOUS CONTINUOUS PRN
Status: DISCONTINUED | OUTPATIENT
Start: 2022-12-07 | End: 2022-12-07 | Stop reason: SDUPTHER

## 2022-12-07 RX ORDER — MORPHINE SULFATE 2 MG/ML
1 INJECTION, SOLUTION INTRAMUSCULAR; INTRAVENOUS EVERY 5 MIN PRN
Status: DISCONTINUED | OUTPATIENT
Start: 2022-12-07 | End: 2022-12-07 | Stop reason: HOSPADM

## 2022-12-07 RX ORDER — MEPERIDINE HYDROCHLORIDE 25 MG/ML
12.5 INJECTION INTRAMUSCULAR; INTRAVENOUS; SUBCUTANEOUS EVERY 5 MIN PRN
Status: DISCONTINUED | OUTPATIENT
Start: 2022-12-07 | End: 2022-12-07 | Stop reason: HOSPADM

## 2022-12-07 RX ORDER — MIDAZOLAM HYDROCHLORIDE 1 MG/ML
INJECTION INTRAMUSCULAR; INTRAVENOUS PRN
Status: DISCONTINUED | OUTPATIENT
Start: 2022-12-07 | End: 2022-12-07 | Stop reason: SDUPTHER

## 2022-12-07 RX ORDER — ONDANSETRON 2 MG/ML
INJECTION INTRAMUSCULAR; INTRAVENOUS PRN
Status: DISCONTINUED | OUTPATIENT
Start: 2022-12-07 | End: 2022-12-07 | Stop reason: SDUPTHER

## 2022-12-07 RX ORDER — OXYCODONE HYDROCHLORIDE AND ACETAMINOPHEN 5; 325 MG/1; MG/1
2 TABLET ORAL EVERY 4 HOURS PRN
Status: DISCONTINUED | OUTPATIENT
Start: 2022-12-07 | End: 2022-12-09 | Stop reason: HOSPADM

## 2022-12-07 RX ORDER — SODIUM CHLORIDE 0.9 % (FLUSH) 0.9 %
5-40 SYRINGE (ML) INJECTION EVERY 12 HOURS SCHEDULED
Status: DISCONTINUED | OUTPATIENT
Start: 2022-12-07 | End: 2022-12-07 | Stop reason: HOSPADM

## 2022-12-07 RX ORDER — DEXAMETHASONE SODIUM PHOSPHATE 4 MG/ML
INJECTION, SOLUTION INTRA-ARTICULAR; INTRALESIONAL; INTRAMUSCULAR; INTRAVENOUS; SOFT TISSUE PRN
Status: DISCONTINUED | OUTPATIENT
Start: 2022-12-07 | End: 2022-12-07 | Stop reason: SDUPTHER

## 2022-12-07 RX ORDER — OXYCODONE HYDROCHLORIDE AND ACETAMINOPHEN 5; 325 MG/1; MG/1
1 TABLET ORAL EVERY 4 HOURS PRN
Status: DISCONTINUED | OUTPATIENT
Start: 2022-12-07 | End: 2022-12-09 | Stop reason: HOSPADM

## 2022-12-07 RX ORDER — FENTANYL CITRATE 50 UG/ML
INJECTION, SOLUTION INTRAMUSCULAR; INTRAVENOUS PRN
Status: DISCONTINUED | OUTPATIENT
Start: 2022-12-07 | End: 2022-12-07 | Stop reason: SDUPTHER

## 2022-12-07 RX ORDER — LIDOCAINE HYDROCHLORIDE 10 MG/ML
INJECTION, SOLUTION INFILTRATION; PERINEURAL PRN
Status: DISCONTINUED | OUTPATIENT
Start: 2022-12-07 | End: 2022-12-07 | Stop reason: ALTCHOICE

## 2022-12-07 RX ORDER — LIDOCAINE HYDROCHLORIDE 20 MG/ML
INJECTION, SOLUTION EPIDURAL; INFILTRATION; INTRACAUDAL; PERINEURAL PRN
Status: DISCONTINUED | OUTPATIENT
Start: 2022-12-07 | End: 2022-12-07 | Stop reason: SDUPTHER

## 2022-12-07 RX ADMIN — FENTANYL CITRATE 50 MCG: 50 INJECTION, SOLUTION INTRAMUSCULAR; INTRAVENOUS at 12:47

## 2022-12-07 RX ADMIN — FENTANYL CITRATE 50 MCG: 50 INJECTION, SOLUTION INTRAMUSCULAR; INTRAVENOUS at 12:45

## 2022-12-07 RX ADMIN — PROPOFOL 100 MCG/KG/MIN: 10 INJECTION, EMULSION INTRAVENOUS at 12:45

## 2022-12-07 RX ADMIN — FENTANYL CITRATE 50 MCG: 50 INJECTION, SOLUTION INTRAMUSCULAR; INTRAVENOUS at 13:29

## 2022-12-07 RX ADMIN — OXYCODONE AND ACETAMINOPHEN 1 TABLET: 5; 325 TABLET ORAL at 19:26

## 2022-12-07 RX ADMIN — SODIUM CHLORIDE 3000 MG: 900 INJECTION INTRAVENOUS at 14:30

## 2022-12-07 RX ADMIN — DEXAMETHASONE SODIUM PHOSPHATE 10 MG: 4 INJECTION, SOLUTION INTRAMUSCULAR; INTRAVENOUS at 12:48

## 2022-12-07 RX ADMIN — MIDAZOLAM 2 MG: 1 INJECTION INTRAMUSCULAR; INTRAVENOUS at 12:42

## 2022-12-07 RX ADMIN — OXYCODONE AND ACETAMINOPHEN 1 TABLET: 5; 325 TABLET ORAL at 14:23

## 2022-12-07 RX ADMIN — LIDOCAINE HYDROCHLORIDE 40 MG: 20 INJECTION, SOLUTION EPIDURAL; INFILTRATION; INTRACAUDAL; PERINEURAL at 12:45

## 2022-12-07 RX ADMIN — SODIUM CHLORIDE 3000 MG: 900 INJECTION INTRAVENOUS at 21:02

## 2022-12-07 RX ADMIN — ONDANSETRON 4 MG: 2 INJECTION INTRAMUSCULAR; INTRAVENOUS at 12:48

## 2022-12-07 RX ADMIN — SODIUM CHLORIDE 3000 MG: 900 INJECTION INTRAVENOUS at 08:24

## 2022-12-07 RX ADMIN — FENTANYL CITRATE 50 MCG: 50 INJECTION, SOLUTION INTRAMUSCULAR; INTRAVENOUS at 13:09

## 2022-12-07 RX ADMIN — SODIUM CHLORIDE 3000 MG: 900 INJECTION INTRAVENOUS at 02:27

## 2022-12-07 RX ADMIN — SODIUM CHLORIDE: 9 INJECTION, SOLUTION INTRAVENOUS at 12:41

## 2022-12-07 ASSESSMENT — PAIN SCALES - GENERAL
PAINLEVEL_OUTOF10: 0
PAINLEVEL_OUTOF10: 0
PAINLEVEL_OUTOF10: 4
PAINLEVEL_OUTOF10: 5

## 2022-12-07 ASSESSMENT — PAIN - FUNCTIONAL ASSESSMENT
PAIN_FUNCTIONAL_ASSESSMENT: 0-10
PAIN_FUNCTIONAL_ASSESSMENT: PREVENTS OR INTERFERES SOME ACTIVE ACTIVITIES AND ADLS

## 2022-12-07 ASSESSMENT — PAIN DESCRIPTION - PAIN TYPE: TYPE: SURGICAL PAIN

## 2022-12-07 ASSESSMENT — LIFESTYLE VARIABLES: SMOKING_STATUS: 1

## 2022-12-07 ASSESSMENT — PAIN DESCRIPTION - ORIENTATION: ORIENTATION: LEFT

## 2022-12-07 ASSESSMENT — PAIN DESCRIPTION - LOCATION
LOCATION: HAND
LOCATION: HAND

## 2022-12-07 ASSESSMENT — PAIN DESCRIPTION - DESCRIPTORS: DESCRIPTORS: DULL

## 2022-12-07 NOTE — PATIENT CARE CONFERENCE
[facility] Quality Flow/Interdisciplinary Rounds Progress Note        Quality Flow Rounds held on December 7, 2022    Disciplines Attending:  Bedside Nurse, , , and Nursing Unit Leadership    Jorge Shrestha was admitted on 12/5/2022 12:42 PM    Anticipated Discharge Date:       Disposition:    Roberto Score:  Roberto Scale Score: 22    Readmission Risk              Risk of Unplanned Readmission:  5           Discussed patient goal for the day, patient clinical progression, and barriers to discharge.   The following Goal(s) of the Day/Commitment(s) have been identified:  Diagnostics - Report Results      Paul Jacobson RN  December 7, 2022

## 2022-12-07 NOTE — PROGRESS NOTES
excisional debridement.  note from today appreciated. If needed patient is agreeable to infusion center for IV antibiotics or home health care.       Objective:     PHYSICAL EXAM:    VS: BP (!) 144/74   Pulse 92   Temp 99.1 °F (37.3 °C) (Temporal)   Resp 18   Ht 5' 7\" (1.702 m)   Wt 195 lb (88.5 kg)   SpO2 96%   BMI 30.54 kg/m²     Labs:   CBC:   Lab Results   Component Value Date/Time    WBC 7.7 12/07/2022 02:30 AM    RBC 4.02 12/07/2022 02:30 AM    HGB 12.5 12/07/2022 02:30 AM    HCT 35.7 12/07/2022 02:30 AM    MCV 88.8 12/07/2022 02:30 AM    MCH 31.1 12/07/2022 02:30 AM    MCHC 35.0 12/07/2022 02:30 AM    RDW 11.5 12/07/2022 02:30 AM     12/07/2022 02:30 AM    MPV 9.0 12/07/2022 02:30 AM     CBC with Differential:    Lab Results   Component Value Date/Time    WBC 7.7 12/07/2022 02:30 AM    RBC 4.02 12/07/2022 02:30 AM    HGB 12.5 12/07/2022 02:30 AM    HCT 35.7 12/07/2022 02:30 AM     12/07/2022 02:30 AM    MCV 88.8 12/07/2022 02:30 AM    MCH 31.1 12/07/2022 02:30 AM    MCHC 35.0 12/07/2022 02:30 AM    RDW 11.5 12/07/2022 02:30 AM    LYMPHOPCT 25.6 12/07/2022 02:30 AM    MONOPCT 12.8 12/07/2022 02:30 AM    BASOPCT 0.3 12/07/2022 02:30 AM    MONOSABS 0.99 12/07/2022 02:30 AM    LYMPHSABS 1.98 12/07/2022 02:30 AM    EOSABS 0.06 12/07/2022 02:30 AM    BASOSABS 0.02 12/07/2022 02:30 AM     Hemoglobin/Hematocrit:    Lab Results   Component Value Date/Time    HGB 12.5 12/07/2022 02:30 AM    HCT 35.7 12/07/2022 02:30 AM     CMP:    Lab Results   Component Value Date/Time     12/07/2022 02:30 AM    K 3.9 12/07/2022 02:30 AM    K 3.6 12/06/2022 02:30 AM     12/07/2022 02:30 AM    CO2 28 12/07/2022 02:30 AM    BUN 8 12/07/2022 02:30 AM    CREATININE 1.1 12/07/2022 02:30 AM    LABGLOM >60 12/07/2022 02:30 AM    GLUCOSE 115 12/07/2022 02:30 AM    PROT 5.9 12/07/2022 02:30 AM    LABALBU 3.4 12/07/2022 02:30 AM    CALCIUM 8.6 12/07/2022 02:30 AM    BILITOT 0.3 12/07/2022 02:30 AM ALKPHOS 47 12/07/2022 02:30 AM    AST 12 12/07/2022 02:30 AM    ALT 12 12/07/2022 02:30 AM     BMP:    Lab Results   Component Value Date/Time     12/07/2022 02:30 AM    K 3.9 12/07/2022 02:30 AM    K 3.6 12/06/2022 02:30 AM     12/07/2022 02:30 AM    CO2 28 12/07/2022 02:30 AM    BUN 8 12/07/2022 02:30 AM    LABALBU 3.4 12/07/2022 02:30 AM    CREATININE 1.1 12/07/2022 02:30 AM    CALCIUM 8.6 12/07/2022 02:30 AM    LABGLOM >60 12/07/2022 02:30 AM    GLUCOSE 115 12/07/2022 02:30 AM     Hepatic Function Panel:    Lab Results   Component Value Date/Time    ALKPHOS 47 12/07/2022 02:30 AM    ALT 12 12/07/2022 02:30 AM    AST 12 12/07/2022 02:30 AM    PROT 5.9 12/07/2022 02:30 AM    BILITOT 0.3 12/07/2022 02:30 AM    BILIDIR <0.2 12/07/2022 02:30 AM    IBILI see below 12/07/2022 02:30 AM    LABALBU 3.4 12/07/2022 02:30 AM     Ionized Calcium:  No results found for: IONCA  Magnesium:  No results found for: MG  Phosphorus:  No results found for: PHOS  LDH:  No results found for: LDH  Uric Acid:    Lab Results   Component Value Date/Time    LABURIC 4.3 12/07/2022 02:30 AM     PT/INR:  No results found for: PROTIME, INR  Warfarin PT/INR:  No components found for: PTPATWAR, PTINRWAR  PTT:  No results found for: APTT, PTT[APTT}  Troponin:  No results found for: TROPONINI  Last 3 Troponin:  No results found for: TROPONINI  U/A:  No results found for: NITRITE, COLORU, PROTEINU, PHUR, LABCAST, WBCUA, RBCUA, MUCUS, TRICHOMONAS, YEAST, BACTERIA, CLARITYU, SPECGRAV, LEUKOCYTESUR, UROBILINOGEN, BILIRUBINUR, BLOODU, GLUCOSEU, AMORPHOUS  HgBA1c:    Lab Results   Component Value Date/Time    LABA1C 5.1 12/07/2022 02:30 AM     FLP:    Lab Results   Component Value Date/Time    TRIG 72 12/07/2022 02:30 AM    HDL 53 12/07/2022 02:30 AM    LDLCALC 69 12/07/2022 02:30 AM    LABVLDL 14 12/07/2022 02:30 AM     TSH:    Lab Results   Component Value Date/Time    TSH 2.230 12/07/2022 02:30 AM     VITAMIN B12: No components found for: B12  FOLATE:  No results found for: FOLATE  IRON:  No results found for: IRON  Iron Saturation:  No components found for: PERCENTFE  TIBC:  No results found for: TIBC  FERRITIN:  No results found for: FERRITIN     General appearance: Alert, Awake, Oriented times 3, no distress   Skin: Warm and dry ; no rashes  Head: Normocephalic. No masses, lesions or tenderness noted  Eyes: Conjunctivae pink, sclera white. PERRL,EOM-I  Ears: External ears normal  Nose/Sinuses: Nares normal. Septum midline. Mucosa normal. No drainage  Oropharynx: Oropharynx clear with no exudate seen  Neck: Supple. No jugular venous distension, lymphadenopathy or thyromegaly Trachea midline  Lungs: Clear to auscultation bilaterally. No rhonchi, crackles or wheezes  Heart: S1 S2  Regular rate and rhythm. No rub, gallop, murmur  Abdomen: Soft, non-tender. BS normal. No masses, organomegaly; no rebound or guarding  Extremities: Surgical dressing left hand and half his forearm; proximal forearm and distal humerus with mild edema but no erythema; he is propping his arm on full wedge. Musculoskeletal: Muscular strength appears intact. Neuro:  No focal motor defects ; II-XII grossly intact . DANIEL equally        ASSESSMENT:   Principal Problem:    Cellulitis  Active Problems:    Dog bite of left hand    Fracture of base of third metacarpal bone    Septic arthritis of hand, left (HCC)  Resolved Problems:    * No resolved hospital problems. *      PLAN:  SEE ORDERS      RE  CHANGES AND FINDINGS   Medications reviewed with patient  GI prophylaxis  DVT prophylaxis  Consultants notes reviewed  OR today for excision and/or debridement left hand  Unasyn 3 g IV every 6 hours  Anticoagulation on hold per orthopedics  Patient received diphtheria tetanus toxoid in the ED  Percocet 5/10 mg every 4 hours as needed for pain  Await results of deep cultures        Discussed with patient and spouse and nursing.       [address]     11:43 AM 12/7/2022    TIME > 35 MINUTES    >  50 %  OF  TIME  DISCUSSION               ------------  INFORMATION  -----------      DIET:Diet NPO Exceptions are: Sips of Water with Meds      No Known Allergies      MEDICATION SIDE EFFECTS:none       SCHEDULED MEDS:                                 Scheduled Meds:   pantoprazole  40 mg Oral QAM AC    sodium chloride flush  10 mL IntraVENous 2 times per day    enoxaparin  40 mg SubCUTAneous Daily    ampicillin-sulbactam  3,000 mg IntraVENous Q6H       Continuous Infusions:   sodium chloride 75 mL/hr at 12/06/22 2007    sodium chloride           Data:       Intake/Output Summary (Last 24 hours) at 12/7/2022 1143  Last data filed at 12/7/2022 0547  Gross per 24 hour   Intake 1445 ml   Output 1300 ml   Net 145 ml       Wt Readings from Last 3 Encounters:   12/07/22 195 lb (88.5 kg)   01/16/19 192 lb (87.1 kg)       Labs: Additional    GLUCOSE:No results for input(s): POCGLU in the last 72 hours. BNP:No results found for: BNP    CRP: No results for input(s): CRP in the last 72 hours. ESR:  Recent Labs     12/06/22  0230 12/07/22  0230   SEDRATE 24* 32*       RADIOLOGY: REVIEWED AVAILABLE REPORT  XR HAND LEFT (MIN 3 VIEWS)   Final Result   Small avulsion fragment adjacent to the 3rd metacarpal head. Soft tissue   swelling. Osteoarthritis.                    [address]    11:43 AM     12/7/2022      Voice recognition software used for dictation

## 2022-12-07 NOTE — BRIEF OP NOTE
Brief Postoperative Note      Patient: Annalise Espinoza  YOB: 1970  MRN: [id number]    Date of Procedure: 12/7/2022    Pre-Op Diagnosis: Cellulitis of left hand [E58.656]  Dog bite of left hand, initial encounter [S61.452A, W54. 0XXA]    Post-Op Diagnosis: Same       Procedure(s):  LEFT HAND  INCISION AND DEBRIDEMENT    Surgeon(s):  Zelda Hardwick MD    Assistant:  Physician Assistant: NELLY Tapia    Anesthesia: General    Estimated Blood Loss (mL): Minimal    Complications: None    Specimens:   ID Type Source Tests Collected by Time Destination   1 : SEPTIC ARTHRITIS LEFT HAND Tissue Hand CULTURE, ANAEROBIC, CULTURE, FUNGUS, GRAM STAIN, CULTURE, SURGICAL, CULTURE WITH SMEAR, ACID FAST Aidan Galicia MD 12/7/2022 1255    A : SEPTIC ARTHRITIS LEFT HAND Tissue Hand SURGICAL PATHOLOGY Zelda Hardwick MD 12/7/2022 1258        Implants:  * No implants in log *      Drains: * No LDAs found *    Findings: see op note    Electronically signed by NELLY Tapia on 12/7/2022 at 1:22 PM

## 2022-12-07 NOTE — ANESTHESIA PRE PROCEDURE
Department of Anesthesiology  Preprocedure Note       Name:  Gifty Forbes   Age:  50 y.o.  :  1970                                          MRN:  [id number]         Date:  2022      Surgeon: Thompson Kevin):  Cali Valladares MD    Procedure: Procedure(s):  LEFT HAND  INCISION AND DEBRIDEMENT    Medications prior to admission:   Prior to Admission medications    Medication Sig Start Date End Date Taking? Authorizing Provider   ibuprofen (ADVIL;MOTRIN) 200 MG tablet Take 200 mg by mouth every 6 hours as needed for Pain    Historical Provider, MD       Current medications:    No current facility-administered medications for this visit. No current outpatient medications on file.      Facility-Administered Medications Ordered in Other Visits   Medication Dose Route Frequency Provider Last Rate Last Admin    pantoprazole (PROTONIX) tablet 40 mg  40 mg Oral QAM AC Edy Acevesk, DO        HYDROcodone-acetaminophen (NORCO) 5-325 MG per tablet 1 tablet  1 tablet Oral Q4H PRN Miah Noblehok, DO   1 tablet at 22    potassium chloride (KLOR-CON M) extended release tablet 40 mEq  40 mEq Oral PRN Miah Benjaminkin Mihok, DO        Or    potassium bicarb-citric acid (EFFER-K) effervescent tablet 40 mEq  40 mEq Oral PRN Miah Souravkin Mihok, DO        Or    potassium chloride 10 mEq/100 mL IVPB (Peripheral Line)  10 mEq IntraVENous PRN Miah Calkin Mihok, DO        ibuprofen (ADVIL;MOTRIN) tablet 800 mg  800 mg Oral Q8H PRN Ascencion Devine Learn, APRN - CNP        0.9 % sodium chloride infusion   IntraVENous Continuous Ascencion Devine Learn, APRN - CNP 75 mL/hr at 22 Rate Verify at 22    sodium chloride flush 0.9 % injection 10 mL  10 mL IntraVENous 2 times per day Lavacajuana Devine Learn, APRN - CNP   10 mL at 22    sodium chloride flush 0.9 % injection 10 mL  10 mL IntraVENous PRN Lavacajuana Devine Learn, APRN - CNP        0.9 % sodium chloride infusion   IntraVENous PRN Ascencionjuana Redd, APRN - CNP  enoxaparin (LOVENOX) injection 40 mg  40 mg SubCUTAneous Daily Linden Redd, APRN - CNP        ondansetron (ZOFRAN-ODT) disintegrating tablet 4 mg  4 mg Oral Q8H PRN Linden Agosto Learn, APRN - CNP        Or    ondansetron [facility]) injection 4 mg  4 mg IntraVENous Q6H PRN Linden Dempseya Learn, APRN - CNP        magnesium hydroxide (MILK OF MAGNESIA) 400 MG/5ML suspension 30 mL  30 mL Oral Daily PRN Linden Surendra Learn, APRN - CNP        acetaminophen (TYLENOL) tablet 650 mg  650 mg Oral Q6H PRN Linden Dempseya Learn, APRN - CNP        Or    acetaminophen (TYLENOL) suppository 650 mg  650 mg Rectal Q6H PRN Linden Agosto Learn, APRN - CNP        ampicillin-sulbactam (UNASYN) 3,000 mg in sodium chloride 0.9 % 100 mL IVPB (mini-bag)  3,000 mg IntraVENous Q6H Linden Redd, APRN - CNP   Stopped at 12/07/22 0947       Allergies:  No Known Allergies    Problem List:    Patient Active Problem List   Diagnosis Code    Cellulitis L03.90    Dog bite of left hand S61.452A, W54. 0XXA    Fracture of base of third metacarpal bone S62.318A    Septic arthritis of hand, left ([address] M00.9       Past Medical History:        Diagnosis Date    Cellulitis 12/05/2022    Dog bite of left hand 12/03/2022    Fracture of base of third metacarpal bone 12/03/2022    Dog bite    Septic arthritis of hand, left ([address] 12/7/2022 12/6/2020. Past Surgical History:        Procedure Laterality Date    BICEPS TENDON REPAIR         Social History:    Social History     Tobacco Use    Smoking status: Some Days     Types: Cigars    Smokeless tobacco: Never   Substance Use Topics    Alcohol use: Yes                                Ready to quit: Not Answered  Counseling given: Not Answered      Vital Signs (Current): There were no vitals filed for this visit.                                            BP Readings from Last 3 Encounters:   12/07/22 (!) 144/74   01/16/19 127/66       NPO Status: BMI:   Wt Readings from Last 3 Encounters:   12/07/22 195 lb (88.5 kg)   01/16/19 192 lb (87.1 kg)     There is no height or weight on file to calculate BMI.    CBC:   Lab Results   Component Value Date/Time    WBC 7.7 12/07/2022 02:30 AM    RBC 4.02 12/07/2022 02:30 AM    HGB 12.5 12/07/2022 02:30 AM    HCT 35.7 12/07/2022 02:30 AM    MCV 88.8 12/07/2022 02:30 AM    RDW 11.5 12/07/2022 02:30 AM     12/07/2022 02:30 AM       CMP:   Lab Results   Component Value Date/Time     12/07/2022 02:30 AM    K 3.9 12/07/2022 02:30 AM    K 3.6 12/06/2022 02:30 AM     12/07/2022 02:30 AM    CO2 28 12/07/2022 02:30 AM    BUN 8 12/07/2022 02:30 AM    CREATININE 1.1 12/07/2022 02:30 AM    LABGLOM >60 12/07/2022 02:30 AM    GLUCOSE 115 12/07/2022 02:30 AM    PROT 5.9 12/07/2022 02:30 AM    CALCIUM 8.6 12/07/2022 02:30 AM    BILITOT 0.3 12/07/2022 02:30 AM    ALKPHOS 47 12/07/2022 02:30 AM    AST 12 12/07/2022 02:30 AM    ALT 12 12/07/2022 02:30 AM       POC Tests: No results for input(s): POCGLU, POCNA, POCK, POCCL, POCBUN, POCHEMO, POCHCT in the last 72 hours. Coags: No results found for: PROTIME, INR, APTT    HCG (If Applicable): No results found for: PREGTESTUR, PREGSERUM, HCG, HCGQUANT     ABGs: No results found for: PHART, PO2ART, XUR9YUF, VWI7EVQ, BEART, S3JDUKDS     Type & Screen (If Applicable):  No results found for: LABABO, LABRH    Drug/Infectious Status (If Applicable):  No results found for: HIV, HEPCAB    COVID-19 Screening (If Applicable): No results found for: COVID19        Anesthesia Evaluation  Patient summary reviewed and Nursing notes reviewed  Airway: Mallampati: II     Neck ROM: full  Mouth opening: > = 3 FB   Dental: normal exam         Pulmonary: breath sounds clear to auscultation  (+) current smoker          Patient did not smoke on day of surgery.                  Cardiovascular:Negative CV ROS  Exercise tolerance: good (>4 METS),         ECG reviewed  Rhythm: regular  Rate: normal                    Neuro/Psych:   (+) depression/anxiety             GI/Hepatic/Renal: Neg GI/Hepatic/Renal ROS  (+) GERD:,           Endo/Other:                      ROS comment: Cellulitis after a dog bite Abdominal:       Abdomen: soft. Vascular: Other Findings:             Anesthesia Plan      MAC     ASA 2       Induction: intravenous. MIPS: Postoperative opioids intended and Prophylactic antiemetics administered. Anesthetic plan and risks discussed with patient. Use of blood products discussed with patient whom consented to blood products. Plan discussed with attending. **CHART REVIEW ONLY**    Patient to be re-evaluated by DOS Anesthesiologist and anesthesia pre-op assessment will need to be updated. VITA Peck CRNA   12/7/2022        Pt seen, examined, chart reviewed, plan discussed.   Yumiko Solano MD  12/7/2022  12:52 PM

## 2022-12-07 NOTE — CARE COORDINATION
CM made in room visit to pt and provided explanation of CM role. Pt states he resides w/spouse in a one story home and is independent, no DMEs. He still drives and is established w/Dr. Ghada Cain and uses [facility] on [address]. OR today for left hand debridement w/IV unasyn. Goal is for pt to return home on po antibiotic. In the event pt would need IV antibiotic he is agreeable to the infusion center or to [facility] w/no preference. CM will follow along with  and assist with discharge planning as necessary. FERNANDO AmaroN, RN  [facility] Case Management  [phone]      The Plan for Transition of Care is related to the following treatment goals: [facility] [address]    The Patient was provided with a choice of provider and agrees   with the discharge plan. [x] Yes [] No    Freedom of choice list was provided with basic dialogue that supports the patient's individualized plan of care/goals, treatment preferences and shares the quality data associated with the providers.  [x] Yes [] No

## 2022-12-07 NOTE — DISCHARGE INSTRUCTIONS
DISCHARGE INSTRUCTIONS TO HOME FOLLOWING SURGERY    ACTIVITY INSTRUCTIONS:    Elevate extremity to help reduce swelling. Use Purple Pillow for elevation of hand/arm   Use sling as needed. No heavy lifting, pushing, pulling or strenuous activity    WOUND/DRESSING INSTRUCTIONS:  Always ensure you and your care giver clean hands before and after caring for the wound. Keep the dressing, cast, or splint clean and dry until seen in office. Do not remove dressing   OK to shower- Keep your dressing dry. Can ice surgical region to help reduce swelling, Do not apply ice to fingers or toes       MEDICATION INSTRUCTIONS:  Take pain medicine as directed. When taking pain medications, you may experience dizziness or drowsiness. Do not drink alcohol or drive when taking these medications. Do not take any other medication containing acetaminophen (Tylenol) while taking the prescribed pain medication. Ibuprofen, Aleve, Motrin, or Naproxen are okay but should not be taken on an empty stomach. *Watch for these significant complications:  Call physician if these or any other problems occur:  Fever over 101°, redness, swelling or warmth at the operative site  Unrelieved nausea    Foul smelling or cloudy drainage at the operative site   Unrelieved pain  Breathing issues such as shortness of breath or difficulty breathing    Blood soaked dressing. (Some oozing may be normal)     Numb, pale, blue, cold or tingling extremity       Make an appointment to be seen 8-10 days after surgery  FOLLOW-UP CARE:    Dr. Jos Leahy.  Trino Clements [address] [place] [address]  [phone]

## 2022-12-07 NOTE — PROGRESS NOTES
Alicia 5 , CHART REVIEWED LABS NOTED, VARIFIED PROCEDURE WITH PT , ALLERGIES, NPO STATUS , ONLY BELONGINGS WITH PT IS CELL PHONE AND HIS WIFE IS ON WAY IN TO HOSPITAL AT THIS TIME SPOKE WIT HE ON WAY DOWN TO PREOP

## 2022-12-07 NOTE — CARE COORDINATION
Social Work discharge planning    Sw consult for discharge planning noted. CM advised she spoke to pt this morning for planning. Social Work to follow for support and discharge planning with CM.   Electronically signed by Rashard Curtis on 12/7/2022 at 10:57 AM

## 2022-12-07 NOTE — ANESTHESIA PRE PROCEDURE
Department of Anesthesiology  Preprocedure Note       Name:  Gifty Forbes   Age:  50 y.o.  :  1970                                          MRN:  [id number]         Date:  2022      Surgeon: Thompson Kevin):  Cali Valladares MD    Procedure: Procedure(s):  LEFT HAND  INCISION AND DEBRIDEMENT    Medications prior to admission:   Prior to Admission medications    Medication Sig Start Date End Date Taking?  Authorizing Provider   ibuprofen (ADVIL;MOTRIN) 200 MG tablet Take 200 mg by mouth every 6 hours as needed for Pain   Yes Historical Provider, MD       Current medications:    Current Facility-Administered Medications   Medication Dose Route Frequency Provider Last Rate Last Admin    [START ON 2022] pantoprazole (PROTONIX) tablet 40 mg  40 mg Oral QAM AC Edy Nagy, DO        HYDROcodone-acetaminophen (NORCO) 5-325 MG per tablet 1 tablet  1 tablet Oral Q4H PRN Miah Acevesk, DO   1 tablet at 22    potassium chloride (KLOR-CON M) extended release tablet 40 mEq  40 mEq Oral PRN Miah Acevesk, DO        Or    potassium bicarb-citric acid (EFFER-K) effervescent tablet 40 mEq  40 mEq Oral PRN Miah Noblehok, DO        Or    potassium chloride 10 mEq/100 mL IVPB (Peripheral Line)  10 mEq IntraVENous PRN Miah Noblehok, DO        ibuprofen (ADVIL;MOTRIN) tablet 800 mg  800 mg Oral Q8H PRN Ascencion Redd, APRN - CNP        0.9 % sodium chloride infusion   IntraVENous Continuous Ascencionjuana Redd, APRN - CNP 75 mL/hr at 22 Rate Verify at 22    sodium chloride flush 0.9 % injection 10 mL  10 mL IntraVENous 2 times per day [place] Devine Learn, APRN - CNP   10 mL at 22    sodium chloride flush 0.9 % injection 10 mL  10 mL IntraVENous PRN Ascencionjuana Redd, APRN - CNP        0.9 % sodium chloride infusion   IntraVENous PRN Ascencionjuana Redd, APRN - CNP        enoxaparin (LOVENOX) injection 40 mg  40 mg SubCUTAneous Daily Ascencionjuana Redd, APRN - CNP  ondansetron (ZOFRAN-ODT) disintegrating tablet 4 mg  4 mg Oral Q8H PRN Maico Lopez Learn, APRN - CNP        Or    ondansetron [facility]) injection 4 mg  4 mg IntraVENous Q6H PRN [place] Lopez Learn, APRN - CNP        magnesium hydroxide (MILK OF MAGNESIA) 400 MG/5ML suspension 30 mL  30 mL Oral Daily PRN Maico Lopez Learn, APRN - CNP        acetaminophen (TYLENOL) tablet 650 mg  650 mg Oral Q6H PRN [place] Lopez Learn, APRN - CNP        Or    acetaminophen (TYLENOL) suppository 650 mg  650 mg Rectal Q6H PRN Maico Lopez Learn, APRN - CNP        ampicillin-sulbactam (UNASYN) 3,000 mg in sodium chloride 0.9 % 100 mL IVPB (mini-bag)  3,000 mg IntraVENous Q6H Maico Lopez Learn, APRN -  mL/hr at 12/06/22 2014 3,000 mg at 12/06/22 2014       Allergies:  No Known Allergies    Problem List:    Patient Active Problem List   Diagnosis Code    Cellulitis L03.90    Dog bite of left hand S61.452A, W54. [email or web address]       Past Medical History:        Diagnosis Date    Cellulitis 12/05/2022    Dog bite of left hand 12/03/2022       Past Surgical History:        Procedure Laterality Date    BICEPS TENDON REPAIR         Social History:    Social History     Tobacco Use    Smoking status: Some Days     Types: Cigars    Smokeless tobacco: Never   Substance Use Topics    Alcohol use:  Yes                                Ready to quit: Not Answered  Counseling given: Not Answered      Vital Signs (Current):   Vitals:    12/06/22 0812 12/06/22 1110 12/06/22 2000 12/06/22 2015   BP: 115/68  121/73    Pulse: (!) 104 92 94    Resp: 16  16 16   Temp: 99.7 °F (37.6 °C) 99.7 °F (37.6 °C) 99.4 °F (37.4 °C)    TempSrc: Oral Oral Oral    SpO2: 94%  95%    Weight:       Height:                                                  BP Readings from Last 3 Encounters:   12/06/22 121/73   01/16/19 127/66       NPO Status:                                                                                 BMI:   Wt Readings from Last 3 Encounters: 12/06/22 194 lb (88 kg)   01/16/19 192 lb (87.1 kg)     Body mass index is 30.38 kg/m². CBC:   Lab Results   Component Value Date/Time    WBC 9.6 12/06/2022 02:30 AM    RBC 4.19 12/06/2022 02:30 AM    HGB 12.9 12/06/2022 02:30 AM    HCT 37.1 12/06/2022 02:30 AM    MCV 88.5 12/06/2022 02:30 AM    RDW 11.7 12/06/2022 02:30 AM     12/06/2022 02:30 AM       CMP:   Lab Results   Component Value Date/Time     12/06/2022 02:30 AM    K 3.6 12/06/2022 02:30 AM     12/06/2022 02:30 AM    CO2 23 12/06/2022 02:30 AM    BUN 16 12/06/2022 02:30 AM    CREATININE 1.0 12/06/2022 02:30 AM    LABGLOM >60 12/06/2022 02:30 AM    GLUCOSE 113 12/06/2022 02:30 AM    CALCIUM 8.5 12/06/2022 02:30 AM       POC Tests: No results for input(s): POCGLU, POCNA, POCK, POCCL, POCBUN, POCHEMO, POCHCT in the last 72 hours. Coags: No results found for: PROTIME, INR, APTT    HCG (If Applicable): No results found for: PREGTESTUR, PREGSERUM, HCG, HCGQUANT     ABGs: No results found for: PHART, PO2ART, JRJ3OCK, VNT8QLC, BEART, A8PGSCMX     Type & Screen (If Applicable):  No results found for: LABABO, LABRH    Drug/Infectious Status (If Applicable):  No results found for: HIV, HEPCAB    COVID-19 Screening (If Applicable): No results found for: COVID19        Anesthesia Evaluation  Patient summary reviewed and Nursing notes reviewed  Airway:           Dental:          Pulmonary:   (+) current smoker                           Cardiovascular:Negative CV ROS  Exercise tolerance: good (>4 METS),                     Neuro/Psych:               GI/Hepatic/Renal: Neg GI/Hepatic/Renal ROS            Endo/Other:                      ROS comment: Cellulitis after a dog bite Abdominal:             Vascular: Other Findings:           Anesthesia Plan      general     ASA 2       Induction: intravenous. MIPS: Postoperative opioids intended and Prophylactic antiemetics administered.                   **CHART REVIEW ONLY**    Patient to be re-evaluated by DOS Anesthesiologist and anesthesia pre-op assessment will need to be updated.           Siobhan Tripp MD   12/6/2022

## 2022-12-07 NOTE — PROGRESS NOTES
[address] [place] [address] [facility]  KATEY  Progress Note    SUBJECTIVE:  Chief Complaint   Patient presents with    Animal Bite     Left hand on Saturday. Not owner of dog, seen at urgent care and sent in for IV abx. Patient is tolerating medications. No reported adverse drug reactions. No nausea, vomiting, diarrhea. In bed, in no distress  Feeling well, waiting for the OR today  Has some pain to L hand specifically his middle finger on movement   Tmax: 99.9 today     Review of systems:  As stated above in the chief complaint, otherwise negative. Medications:  Scheduled Meds:   pantoprazole  40 mg Oral QAM AC    sodium chloride flush  10 mL IntraVENous 2 times per day    enoxaparin  40 mg SubCUTAneous Daily    ampicillin-sulbactam  3,000 mg IntraVENous Q6H     Continuous Infusions:   sodium chloride 75 mL/hr at 22    sodium chloride       PRN Meds:HYDROcodone 5 mg - acetaminophen, potassium chloride **OR** potassium alternative oral replacement **OR** potassium chloride, ibuprofen, sodium chloride flush, sodium chloride, ondansetron **OR** ondansetron, magnesium hydroxide, acetaminophen **OR** acetaminophen    OBJECTIVE:  /70   Pulse 95   Temp 99.5 °F (37.5 °C) (Oral)   Resp 16   Ht 5' 7\" (1.702 m)   Wt 195 lb (88.5 kg)   SpO2 97%   BMI 30.54 kg/m²   Temp  Av.6 °F (37.6 °C)  Min: 99.4 °F (37.4 °C)  Max: 99.9 °F (37.7 °C)  Constitutional: The patient is awake, alert, and oriented. In bed. In no distress. Skin: Warm and dry. No rashes were noted. HEENT: Round and reactive pupils. Moist mucous membranes. No ulcerations or thrush. Neck: Supple to movements. Chest: No use of accessory muscles to breathe. Symmetrical expansion. No wheezing, crackles or rhonchi. Cardiovascular: S1 and S2 are rhythmic and regular. No murmurs appreciated. Abdomen: Positive bowel sounds to auscultation. Benign to palpation. No masses felt. No hepatosplenomegaly.   Extremities: Left hand wrapped in dressing, fingers exposed. Swelling in fingers. Able to move them. Has some discomfort when moving left middle finger. Lines: peripheral    Laboratory and Tests Review:  Lab Results   Component Value Date    WBC 7.7 12/07/2022    WBC 9.6 12/06/2022    WBC 13.2 (H) 12/05/2022    HGB 12.5 12/07/2022    HCT 35.7 (L) 12/07/2022    MCV 88.8 12/07/2022     12/07/2022     Lab Results   Component Value Date    NEUTROABS 4.67 12/07/2022    NEUTROABS 7.19 12/06/2022    NEUTROABS 9.88 (H) 12/05/2022     No results found for: CRPHS  Lab Results   Component Value Date    ALT 12 12/07/2022    AST 12 12/07/2022    ALKPHOS 47 12/07/2022    BILITOT 0.3 12/07/2022     Lab Results   Component Value Date/Time     12/07/2022 02:30 AM    K 3.9 12/07/2022 02:30 AM    K 3.6 12/06/2022 02:30 AM     12/07/2022 02:30 AM    CO2 28 12/07/2022 02:30 AM    BUN 8 12/07/2022 02:30 AM    CREATININE 1.1 12/07/2022 02:30 AM    CREATININE 1.0 12/06/2022 02:30 AM    CREATININE 1.0 12/05/2022 12:56 PM    LABGLOM >60 12/07/2022 02:30 AM    GLUCOSE 115 12/07/2022 02:30 AM    PROT 5.9 12/07/2022 02:30 AM    LABALBU 3.4 12/07/2022 02:30 AM    CALCIUM 8.6 12/07/2022 02:30 AM    BILITOT 0.3 12/07/2022 02:30 AM    ALKPHOS 47 12/07/2022 02:30 AM    AST 12 12/07/2022 02:30 AM    ALT 12 12/07/2022 02:30 AM     No results found for: CRP  Lab Results   Component Value Date    SEDRATE 32 (H) 12/07/2022    SEDRATE 24 (H) 12/06/2022     Radiology:      Microbiology:   None at this time     Recent Labs     12/06/22  0230   PROCAL 0.11*       ASSESSMENT:  Left hand cellulitis from dog bite: pending OR     PLAN:  Continue IV Unasyn 3gr q 6  Ortho following: for MCP joint excisional debridement today   Check final cultures  Monitor labs  Will continue to follow     VITA Chino - CNP  10:38 AM  12/7/2022     Pt was in the OR.

## 2022-12-08 LAB
ALBUMIN SERPL-MCNC: 3.2 G/DL (ref 3.5–5.2)
ALP BLD-CCNC: 51 U/L (ref 40–129)
ALT SERPL-CCNC: 8 U/L (ref 0–40)
ANION GAP SERPL CALCULATED.3IONS-SCNC: 9 MMOL/L (ref 7–16)
AST SERPL-CCNC: 11 U/L (ref 0–39)
BASOPHILS ABSOLUTE: 0.02 E9/L (ref 0–0.2)
BASOPHILS RELATIVE PERCENT: 0.3 % (ref 0–2)
BILIRUB SERPL-MCNC: 0.5 MG/DL (ref 0–1.2)
BILIRUBIN DIRECT: <0.2 MG/DL (ref 0–0.3)
BILIRUBIN, INDIRECT: ABNORMAL MG/DL (ref 0–1)
BUN BLDV-MCNC: 8 MG/DL (ref 6–20)
C-REACTIVE PROTEIN: 10.5 MG/DL (ref 0–0.4)
C-REACTIVE PROTEIN: 6.3 MG/DL (ref 0–0.4)
CALCIUM SERPL-MCNC: 8.5 MG/DL (ref 8.6–10.2)
CHLORIDE BLD-SCNC: 104 MMOL/L (ref 98–107)
CO2: 27 MMOL/L (ref 22–29)
CREAT SERPL-MCNC: 1 MG/DL (ref 0.7–1.2)
EOSINOPHILS ABSOLUTE: 0.1 E9/L (ref 0.05–0.5)
EOSINOPHILS RELATIVE PERCENT: 1.4 % (ref 0–6)
GFR SERPL CREATININE-BSD FRML MDRD: >60 ML/MIN/1.73
GLUCOSE BLD-MCNC: 104 MG/DL (ref 74–99)
GRAM STAIN ORDERABLE: NORMAL
HCT VFR BLD CALC: 33.8 % (ref 37–54)
HEMOGLOBIN: 11.7 G/DL (ref 12.5–16.5)
IMMATURE GRANULOCYTES #: 0.02 E9/L
IMMATURE GRANULOCYTES %: 0.3 % (ref 0–5)
LACTIC ACID: 0.8 MMOL/L (ref 0.5–2.2)
LYMPHOCYTES ABSOLUTE: 1.99 E9/L (ref 1.5–4)
LYMPHOCYTES RELATIVE PERCENT: 27.1 % (ref 20–42)
MAGNESIUM: 1.8 MG/DL (ref 1.6–2.6)
MCH RBC QN AUTO: 29.9 PG (ref 26–35)
MCHC RBC AUTO-ENTMCNC: 34.6 % (ref 32–34.5)
MCV RBC AUTO: 86.4 FL (ref 80–99.9)
MONOCYTES ABSOLUTE: 0.8 E9/L (ref 0.1–0.95)
MONOCYTES RELATIVE PERCENT: 10.9 % (ref 2–12)
NEUTROPHILS ABSOLUTE: 4.42 E9/L (ref 1.8–7.3)
NEUTROPHILS RELATIVE PERCENT: 60 % (ref 43–80)
PDW BLD-RTO: 11.6 FL (ref 11.5–15)
PHOSPHORUS: 4 MG/DL (ref 2.5–4.5)
PLATELET # BLD: 277 E9/L (ref 130–450)
PMV BLD AUTO: 8.8 FL (ref 7–12)
POTASSIUM SERPL-SCNC: 3.8 MMOL/L (ref 3.5–5)
RBC # BLD: 3.91 E12/L (ref 3.8–5.8)
SEDIMENTATION RATE, ERYTHROCYTE: 38 MM/HR (ref 0–15)
SODIUM BLD-SCNC: 140 MMOL/L (ref 132–146)
TOTAL PROTEIN: 5.6 G/DL (ref 6.4–8.3)
WBC # BLD: 7.4 E9/L (ref 4.5–11.5)

## 2022-12-08 PROCEDURE — 83605 ASSAY OF LACTIC ACID: CPT

## 2022-12-08 PROCEDURE — 6370000000 HC RX 637 (ALT 250 FOR IP): Performed by: PHYSICIAN ASSISTANT

## 2022-12-08 PROCEDURE — 6360000002 HC RX W HCPCS: Performed by: PHYSICIAN ASSISTANT

## 2022-12-08 PROCEDURE — 2580000003 HC RX 258

## 2022-12-08 PROCEDURE — 1200000000 HC SEMI PRIVATE

## 2022-12-08 PROCEDURE — 36415 COLL VENOUS BLD VENIPUNCTURE: CPT

## 2022-12-08 PROCEDURE — 6360000002 HC RX W HCPCS

## 2022-12-08 PROCEDURE — 83735 ASSAY OF MAGNESIUM: CPT

## 2022-12-08 PROCEDURE — 99231 SBSQ HOSP IP/OBS SF/LOW 25: CPT | Performed by: ORTHOPAEDIC SURGERY

## 2022-12-08 PROCEDURE — 2580000003 HC RX 258: Performed by: PHYSICIAN ASSISTANT

## 2022-12-08 PROCEDURE — 84100 ASSAY OF PHOSPHORUS: CPT

## 2022-12-08 PROCEDURE — 85025 COMPLETE CBC W/AUTO DIFF WBC: CPT

## 2022-12-08 PROCEDURE — 85651 RBC SED RATE NONAUTOMATED: CPT

## 2022-12-08 PROCEDURE — 86140 C-REACTIVE PROTEIN: CPT

## 2022-12-08 PROCEDURE — 80048 BASIC METABOLIC PNL TOTAL CA: CPT

## 2022-12-08 PROCEDURE — A4216 STERILE WATER/SALINE, 10 ML: HCPCS

## 2022-12-08 PROCEDURE — 80076 HEPATIC FUNCTION PANEL: CPT

## 2022-12-08 RX ORDER — LIDOCAINE HYDROCHLORIDE 10 MG/ML
5 INJECTION, SOLUTION EPIDURAL; INFILTRATION; INTRACAUDAL; PERINEURAL ONCE
Status: COMPLETED | OUTPATIENT
Start: 2022-12-08 | End: 2022-12-09

## 2022-12-08 RX ORDER — OXYCODONE HYDROCHLORIDE AND ACETAMINOPHEN 5; 325 MG/1; MG/1
1 TABLET ORAL EVERY 6 HOURS PRN
Qty: 28 TABLET | Refills: 0 | Status: SHIPPED | OUTPATIENT
Start: 2022-12-08 | End: 2022-12-15

## 2022-12-08 RX ORDER — SODIUM CHLORIDE 9 MG/ML
INJECTION, SOLUTION INTRAVENOUS PRN
Status: DISCONTINUED | OUTPATIENT
Start: 2022-12-08 | End: 2022-12-09 | Stop reason: HOSPADM

## 2022-12-08 RX ORDER — SODIUM CHLORIDE 0.9 % (FLUSH) 0.9 %
5-40 SYRINGE (ML) INJECTION EVERY 12 HOURS SCHEDULED
Status: DISCONTINUED | OUTPATIENT
Start: 2022-12-08 | End: 2022-12-09 | Stop reason: HOSPADM

## 2022-12-08 RX ORDER — HEPARIN SODIUM (PORCINE) LOCK FLUSH IV SOLN 100 UNIT/ML 100 UNIT/ML
3 SOLUTION INTRAVENOUS PRN
Status: DISCONTINUED | OUTPATIENT
Start: 2022-12-08 | End: 2022-12-09 | Stop reason: HOSPADM

## 2022-12-08 RX ORDER — HEPARIN SODIUM (PORCINE) LOCK FLUSH IV SOLN 100 UNIT/ML 100 UNIT/ML
3 SOLUTION INTRAVENOUS EVERY 12 HOURS SCHEDULED
Status: DISCONTINUED | OUTPATIENT
Start: 2022-12-08 | End: 2022-12-09 | Stop reason: HOSPADM

## 2022-12-08 RX ORDER — SODIUM CHLORIDE 0.9 % (FLUSH) 0.9 %
5-40 SYRINGE (ML) INJECTION PRN
Status: DISCONTINUED | OUTPATIENT
Start: 2022-12-08 | End: 2022-12-09 | Stop reason: HOSPADM

## 2022-12-08 RX ADMIN — ERTAPENEM 1000 MG: 1 INJECTION INTRAMUSCULAR; INTRAVENOUS at 18:36

## 2022-12-08 RX ADMIN — SODIUM CHLORIDE 3000 MG: 900 INJECTION INTRAVENOUS at 09:05

## 2022-12-08 RX ADMIN — OXYCODONE AND ACETAMINOPHEN 1 TABLET: 5; 325 TABLET ORAL at 08:48

## 2022-12-08 RX ADMIN — SODIUM CHLORIDE: 9 INJECTION, SOLUTION INTRAVENOUS at 13:20

## 2022-12-08 RX ADMIN — PANTOPRAZOLE SODIUM 40 MG: 40 TABLET, DELAYED RELEASE ORAL at 05:58

## 2022-12-08 RX ADMIN — SODIUM CHLORIDE 3000 MG: 900 INJECTION INTRAVENOUS at 01:56

## 2022-12-08 RX ADMIN — SODIUM CHLORIDE 3000 MG: 900 INJECTION INTRAVENOUS at 13:25

## 2022-12-08 RX ADMIN — OXYCODONE AND ACETAMINOPHEN 1 TABLET: 5; 325 TABLET ORAL at 18:36

## 2022-12-08 RX ADMIN — OXYCODONE AND ACETAMINOPHEN 1 TABLET: 5; 325 TABLET ORAL at 01:58

## 2022-12-08 ASSESSMENT — PAIN DESCRIPTION - DESCRIPTORS
DESCRIPTORS: ACHING;DISCOMFORT;SORE
DESCRIPTORS: ACHING
DESCRIPTORS: ACHING

## 2022-12-08 ASSESSMENT — PAIN DESCRIPTION - LOCATION
LOCATION: WRIST
LOCATION: HAND
LOCATION: ARM

## 2022-12-08 ASSESSMENT — PAIN DESCRIPTION - ORIENTATION
ORIENTATION: LEFT

## 2022-12-08 ASSESSMENT — PAIN SCALES - GENERAL
PAINLEVEL_OUTOF10: 5
PAINLEVEL_OUTOF10: 0
PAINLEVEL_OUTOF10: 6
PAINLEVEL_OUTOF10: 6

## 2022-12-08 ASSESSMENT — PAIN - FUNCTIONAL ASSESSMENT: PAIN_FUNCTIONAL_ASSESSMENT: ACTIVITIES ARE NOT PREVENTED

## 2022-12-08 NOTE — PROGRESS NOTES
[address] [place] [address] [facility]  MAEIDA  Progress Note    SUBJECTIVE:  Chief Complaint   Patient presents with    Animal Bite     Left hand on Saturday. Not owner of dog, seen at urgent care and sent in for IV abx. Patient is tolerating medications. No reported adverse drug reactions. No nausea, vomiting, diarrhea. Sitting up in chair, L hand and forearm wrapped in ACE dressing, elevated with pillow  Feeling well  Tmax: 100.1 today     Review of systems:  As stated above in the chief complaint, otherwise negative. Medications:  Scheduled Meds:   pantoprazole  40 mg Oral QAM AC    sodium chloride flush  10 mL IntraVENous 2 times per day    enoxaparin  40 mg SubCUTAneous Daily    ampicillin-sulbactam  3,000 mg IntraVENous Q6H     Continuous Infusions:   sodium chloride 75 mL/hr at 22    sodium chloride       PRN Meds:oxyCODONE-acetaminophen **OR** oxyCODONE-acetaminophen, potassium chloride **OR** potassium alternative oral replacement **OR** potassium chloride, ibuprofen, sodium chloride flush, sodium chloride, ondansetron **OR** ondansetron, magnesium hydroxide, acetaminophen **OR** acetaminophen    OBJECTIVE:  /67   Pulse 79   Temp 98.8 °F (37.1 °C) (Oral)   Resp 18   Ht 5' 7\" (1.702 m)   Wt 195 lb (88.5 kg)   SpO2 98%   BMI 30.54 kg/m²   Temp  Av °F (37.2 °C)  Min: 98 °F (36.7 °C)  Max: 100.1 °F (37.8 °C)  Constitutional: The patient is awake, alert, and oriented. In chair. In no distress. Skin: Warm and dry. No rashes were noted. HEENT: Round and reactive pupils. Moist mucous membranes. No ulcerations or thrush. Neck: Supple to movements. Chest: No use of accessory muscles to breathe. Symmetrical expansion. No wheezing, crackles or rhonchi. Cardiovascular: S1 and S2 are rhythmic and regular. No murmurs appreciated. Abdomen: Positive bowel sounds to auscultation. Benign to palpation.    Extremities: Left hand wrapped in ACE dressing and elevated on pillow. Some edema above dressing. Lines: peripheral    Laboratory and Tests Review:  Lab Results   Component Value Date    WBC 7.4 12/08/2022    WBC 7.7 12/07/2022    WBC 9.6 12/06/2022    HGB 11.7 (L) 12/08/2022    HCT 33.8 (L) 12/08/2022    MCV 86.4 12/08/2022     12/08/2022     Lab Results   Component Value Date    NEUTROABS 4.42 12/08/2022    NEUTROABS 4.67 12/07/2022    NEUTROABS 7.19 12/06/2022     No results found for: CRPHS  Lab Results   Component Value Date    ALT 8 12/08/2022    AST 11 12/08/2022    ALKPHOS 51 12/08/2022    BILITOT 0.5 12/08/2022     Lab Results   Component Value Date/Time     12/08/2022 04:00 AM    K 3.8 12/08/2022 04:00 AM    K 3.6 12/06/2022 02:30 AM     12/08/2022 04:00 AM    CO2 27 12/08/2022 04:00 AM    BUN 8 12/08/2022 04:00 AM    CREATININE 1.0 12/08/2022 04:00 AM    CREATININE 1.1 12/07/2022 02:30 AM    CREATININE 1.0 12/06/2022 02:30 AM    LABGLOM >60 12/08/2022 04:00 AM    GLUCOSE 104 12/08/2022 04:00 AM    PROT 5.6 12/08/2022 04:00 AM    LABALBU 3.2 12/08/2022 04:00 AM    CALCIUM 8.5 12/08/2022 04:00 AM    BILITOT 0.5 12/08/2022 04:00 AM    ALKPHOS 51 12/08/2022 04:00 AM    AST 11 12/08/2022 04:00 AM    ALT 8 12/08/2022 04:00 AM     No results found for: CRP  Lab Results   Component Value Date    SEDRATE 38 (H) 12/08/2022    SEDRATE 32 (H) 12/07/2022    SEDRATE 24 (H) 12/06/2022     Radiology:      Microbiology:   Surgical Culture 12/7/22: negative so far     Recent Labs     12/06/22  0230   PROCAL 0.11*         ASSESSMENT:  Left hand cellulitis/MT osteomyelitis from dog bite: s/p left middle finger excisional debridement 12/7/22, reviewed op note. It went into the MT/phalageal joint. There was bite jesus manuel on the MT bone.      PLAN:  Switch IV Unasyn to IV Ertapenem  PICC line ordered - spoke with CM about Unasyn v. Ertapenem for DC, planning for 4 weeks, script provided for Ertapenem   Ortho following: s/p left middle finger excisional debridement 12/7/22  Check final cultures  Monitor labs  Will continue to follow     VITA Arevalo CNP  12:24 PM  12/8/2022       Pt seen and examined. Above discussed agree with advanced practice nurse. Labs, cultures, and radiographs reviewed. Face to Face encounter occurred. Changes made as necessary.      Kira Owen MD

## 2022-12-08 NOTE — ANESTHESIA POSTPROCEDURE EVALUATION
Department of Anesthesiology  Postprocedure Note    Patient: Frantz Kemp  MRN: [id number]  YOB: 1970  Date of evaluation: 12/8/2022      Procedure Summary     Date: 12/07/22 Room / Location: [facility] OR 03 / [facility]    Anesthesia Start: 4091 Anesthesia Stop: 8289    Procedure: LEFT HAND  INCISION AND DEBRIDEMENT (Left: Hand) Diagnosis:       Cellulitis of left hand      Dog bite of left hand, initial encounter      (Cellulitis of left hand [L03.114])      (Dog bite of left hand, initial encounter [T33.283U, W54. 0XXA])    Surgeons: Luci Akbar MD Responsible Provider: Mickie Phalen, MD    Anesthesia Type: MAC ASA Status: 2          Anesthesia Type: MAC    Maite Phase I: Maite Score: 10    Maite Phase II: Maite Score: 10      Anesthesia Post Evaluation    Patient location during evaluation: PACU  Patient participation: complete - patient participated  Level of consciousness: awake  Pain score: 3  Airway patency: patent  Nausea & Vomiting: no nausea and no vomiting  Complications: no  Cardiovascular status: blood pressure returned to baseline  Respiratory status: acceptable  Hydration status: euvolemic

## 2022-12-08 NOTE — OP NOTE
[address] [address]                                OPERATIVE REPORT    PATIENT NAME: Jamil Reyes                   :        1970  MED REC NO:   [id number]                            ROOM:       7466  ACCOUNT NO:   [de-identified]                           ADMIT DATE: 2022  PROVIDER:     Rufina Stone MD    DATE OF PROCEDURE:  2022    PREOPERATIVE DIAGNOSES:  1. Left hand septic arthritis, metatarsophalangeal joint. 2.  Left third metacarpal fracture. POSTOPERATIVE DIAGNOSES:  1. Left middle finger metatarsophalangeal joint septic arthritis. 2.  Left middle finger fracture with tooth injury to the metacarpal  head. 3.  Partial sagittal band injury, middle finger. SURGERY PERFORMED:  1. Excisional debridement, left middle finger wound including skin,  subcutaneous tissues, deep fascia, capsule, and bone. 2.  Left hand fluoroscopic examination under anesthesia. ANESTHESIA:  1. Monitored anesthesia care. 2.  Local anesthetic by surgeon consisting of approximately 10 mL of 1%  lidocaine plain. SURGEON:  Rufina Stone MD.    ASSISTANT:  Jennifer Morel, physician assistant certified. She was  present throughout the procedure. Her assistance was used for  preoperative positioning, intraoperative retraction, closure, and  dressing application. Her assistance expedited the case and decreased  the surgical time. An orthopedic surgery resident was unavailable for  case coverage at the time of surgery. TOTAL TOURNIQUET TIME:  13 minutes at 250 mmHg brachial tourniquet. ESTIMATED BLOOD LOSS:  Minimal.    FINDINGS:  1.  Gross purulence involving the metacarpophalangeal joint. There is  direct connection of the skin laceration into the joint involving the  joint capsule as well as a large tooth indentation in the metacarpal  head.   2.  Status post excisional debridement, copious irrigation of the  remaining tissues appeared to be healthy and viable. SPECIMENS:  1. Deep tissue was sent for culture. 2.  Culture swabs were obtained and sent for culture. DISPOSITION:  The patient remained stable throughout the procedure. OPERATIVE INDICATIONS:  The patient is a very pleasant 74-year-old  gentleman who sustained a dog bite injury to his left hand. He delayed  treatment and developed worsening pain and swelling. He ultimately  presented to the Emergency Department and was admitted and started on IV  antibiotics without any significant improvement in his symptomatology. Risks, benefits, alternatives, and complications of surgical  intervention were explained to him including, but not limited to the  risk of continued or worsening infection, damage to nerves, vessels, or  tendons, wound healing complications, stiffness, possible need for  additional surgery, therapy, as well as unforeseen complications. He  voiced understanding and wished to proceed. DESCRIPTION OF PROCEDURE:  The patient was identified in the holding  area. The left hand was identified as the surgical site. He was then  seen by Anesthesia, taken to the operating room, placed supine on the  table, and underwent monitored anesthesia care per Anesthesia  Department. All bony prominences were well padded. A well-padded arm  tourniquet was placed. Under sterile conditions, local anesthetic was  then infiltrated over the surgical site. Arm was then prepared and  draped in the standard sterile fashion. Arm was exsanguinated. Tourniquet was inflated to 250 mmHg. Total tourniquet time was  approximately 13 minutes. The laceration over the dorsal ulnar aspect of the hand was then  inspected. A longitudinal incision incorporating this wound, which was  ellipsed out in a longitudinal fashion, extended proximally over the  third metacarpal of the hand was then created.   The skin and  subcutaneous tissues were excised. The wound was explored and extended  directly down into the metatarsophalangeal joint. There was disruption  of the distal portion of the sagittal band. The capsule was completely  disrupted and the wound extended directly down into the metacarpal head,  where there was a large indentation consistent with a tooth injury. The  deep tissues including tendon, paratenon, capsule, and loose bony  fracture fragments were excised with a 15-blade scalpel followed by  gentle debridement with a rongeur. This tissue was sent for culture as  well. The wound was then thoroughly and copiously irrigated out with  several liters of saline. Fluoroscopy was then brought in.  AP, lateral, and gentle stretch  maneuvers revealed the metacarpophalangeal to be stable. No _____  fractures were assessed. At this point, the tourniquet was deflated. The skin was loosely approximated with nylon sutures followed by bulky  sterile dressing and splint. The patient remained stable and was taken  to the recovery room.         Percy Villeda MD    D: 12/07/2022 16:00:45       T: 12/08/2022 1:25:10     [email or web address]  Job#: [id number]     Doc#: [id number]    CC:

## 2022-12-08 NOTE — PATIENT CARE CONFERENCE
[facility] Quality Flow/Interdisciplinary Rounds Progress Note        Quality Flow Rounds held on December 8, 2022    Disciplines Attending:  Bedside Nurse, , , and Nursing Unit Leadership    Smiley Muse was admitted on 12/5/2022 12:42 PM    Anticipated Discharge Date:       Disposition:    Roberto Score:  Roberto Scale Score: 22    Readmission Risk              Risk of Unplanned Readmission:  8           Discussed patient goal for the day, patient clinical progression, and barriers to discharge.   The following Goal(s) of the Day/Commitment(s) have been identified:  Discharge - Obtain Order and Labs - Report Results      Sachi Donis RN  December 8, 2022

## 2022-12-08 NOTE — PROGRESS NOTES
PROGRESS  NOTE --                                                          INTERNAL  MEDICINE                                                                              I  PERSONALLY SAW , EXAMINED, AND CARED [facility], 12/8/2022     LABS, XRAY ,CHART, AND MEDICATIONS  REVIEWED BY ME       Chief complaint: Animal bite left hand, dog      12/7/2022-SUBJECTIVE: Jennifer Crenshaw is alert awake and cooperative; oriented ×3. Denies any chest pain dyspnea nausea emesis. Tolerating diet. No abdominal pain. Wife present through the exam; patient just returned from the OR. He is noticing more edema of his proximal forearm and now his distal humerus area. That was not present yesterday. Pain controlled. I discussed with him the need to wait until surgical cultures become available which may take 24 to 48 hours or more. Also discussed with him possible need for PICC line and IV antibiotics post hospital.  Temperature has been consistently above 99 °F.  Most recent 99.1 highest 99. Blood pressure 144/74. SPO2 96 on room air. End date and output +455 cc. Procalcitonin elevated 0.11. Fasting glucose 115 protein 5.9. LDL 69 albumin 3.4 liver functions normal.  A1c 5.1 TSH 2.230. WBC normal again at 7.7 hemoglobin 12.5. ESR 32. ASO titer elevated at 214. Ferritin 566, iron iron saturation TIBC all low. ID consult from yesterday appreciated. No cultures were drawn, blood nor wound prior to initiation of Unasyn 3 g every 6 hours; those culture should have been obtained in the ED prior to his admission. Continue Unasyn 3 g IV every 6 hours. Consultation for hand surgery from yesterday appreciated. Patient is right-hand dominant, works in waste water treatment. Impression : dog bite left hand with underlying fracture of metacarpal head and probable septic arthritis of third metacarpal phalangeal joint.   Plan excisional debridement.  note from today appreciated. If needed patient is agreeable to infusion center for IV antibiotics or home health care. 12/8/2022-patient sitting in bedside chair, arm elevated on foam padding. Pain well controlled. No chest pain no dyspnea. States he is having some loose bowels but \"I always get loose bowels while on antibiotics\". Bowel movements 2-3 times a day. No urinary problems appetite good. Highest temperature last 24 hours 100.1. Currently 98.8. SPO2 98 on room air. Intake and output +1735 cc. Fasting glucose 104. Calcium 8.5 ionized calcium from yesterday normal at 1.29. Protein 5.6 albumin 3.2. LDL 69. Liver functions normal.  Hemoglobin 11.7 WBC 7.4. ESR 38. Anaerobic cultures pending. Surgical culture growth not present incubation continues. Gram stain on unspun fluid and rare polymorphonuclear leukocytes, epithelial cells not seen, no organisms seen. Official operative note from yesterday as follows--    DATE OF PROCEDURE:  12/07/2022     PREOPERATIVE DIAGNOSES:  1. Left hand septic arthritis, metatarsophalangeal joint. 2.  Left third metacarpal fracture. POSTOPERATIVE DIAGNOSES:  1. Left middle finger metatarsophalangeal joint septic arthritis. 2.  Left middle finger fracture with tooth injury to the metacarpal  head. 3.  Partial sagittal band injury, middle finger. SURGERY PERFORMED:  1. Excisional debridement, left middle finger wound including skin,  subcutaneous tissues, deep fascia, capsule, and bone. 2.  Left hand fluoroscopic examination under anesthesia. ANESTHESIA:  1. Monitored anesthesia care. 2.  Local anesthetic by surgeon consisting of approximately 10 mL of 1%  lidocaine plain. SURGEON:  Autumn West MD.     ASSISTANT:  Meryle El, physician assistant certified. She was  present throughout the procedure.   Her assistance was used for  preoperative positioning, intraoperative retraction, closure, and  dressing application. Her assistance expedited the case and decreased  the surgical time. An orthopedic surgery resident was unavailable for  case coverage at the time of surgery. TOTAL TOURNIQUET TIME:  13 minutes at 250 mmHg brachial tourniquet. ESTIMATED BLOOD LOSS:  Minimal.     FINDINGS:  1.  Gross purulence involving the metacarpophalangeal joint. There is  direct connection of the skin laceration into the joint involving the  joint capsule as well as a large tooth indentation in the metacarpal  head. 2.  Status post excisional debridement, copious irrigation of the  remaining tissues appeared to be healthy and viable. SPECIMENS:  1. Deep tissue was sent for culture. 2.  Culture swabs were obtained and sent for culture. DISPOSITION:  The patient remained stable throughout the procedure. OPERATIVE INDICATIONS:  The patient is a very pleasant 68-year-old  gentleman who sustained a dog bite injury to his left hand. He delayed  treatment and developed worsening pain and swelling. He ultimately  presented to the Emergency Department and was admitted and started on IV  antibiotics without any significant improvement in his symptomatology. Risks, benefits, alternatives, and complications of surgical  intervention were explained to him including, but not limited to the  risk of continued or worsening infection, damage to nerves, vessels, or  tendons, wound healing complications, stiffness, possible need for  additional surgery, therapy, as well as unforeseen complications. He  voiced understanding and wished to proceed. DESCRIPTION OF PROCEDURE:  The patient was identified in the holding  area. The left hand was identified as the surgical site. He was then  seen by Anesthesia, taken to the operating room, placed supine on the  table, and underwent monitored anesthesia care per Anesthesia  Department. All bony prominences were well padded.   A well-padded arm  tourniquet was placed. Under sterile conditions, local anesthetic was  then infiltrated over the surgical site. Arm was then prepared and  draped in the standard sterile fashion. Arm was exsanguinated. Tourniquet was inflated to 250 mmHg. Total tourniquet time was  approximately 13 minutes. The laceration over the dorsal ulnar aspect of the hand was then  inspected. A longitudinal incision incorporating this wound, which was  ellipsed out in a longitudinal fashion, extended proximally over the  third metacarpal of the hand was then created. The skin and  subcutaneous tissues were excised. The wound was explored and extended  directly down into the metatarsophalangeal joint. There was disruption  of the distal portion of the sagittal band. The capsule was completely  disrupted and the wound extended directly down into the metacarpal head,  where there was a large indentation consistent with a tooth injury. The  deep tissues including tendon, paratenon, capsule, and loose bony  fracture fragments were excised with a 15-blade scalpel followed by  gentle debridement with a rongeur. This tissue was sent for culture as  well. The wound was then thoroughly and copiously irrigated out with  several liters of saline. Fluoroscopy was then brought in.  AP, lateral, and gentle stretch  maneuvers revealed the metacarpophalangeal to be stable. No _____  fractures were assessed. At this point, the tourniquet was deflated. The skin was loosely approximated with nylon sutures followed by bulky  sterile dressing and splint. The patient remained stable and was taken  to the recovery room. Felix Sr MD      Surgical note from today appreciated. Dressing in place dry clean intact. Nonweightbearing p.o. pain control antibiotics per ID. Follow-up in office in 7 to 10 days. Home once antibiotics are arranged with ID.       Objective:     PHYSICAL EXAM:    VS: /67   Pulse 79   Temp 98.8 °F (37.1 °C) (Oral) Resp 18   Ht 5' 7\" (1.702 m)   Wt 195 lb (88.5 kg)   SpO2 98%   BMI 30.54 kg/m²     Labs:   CBC:   Lab Results   Component Value Date/Time    WBC 7.4 12/08/2022 04:00 AM    RBC 3.91 12/08/2022 04:00 AM    HGB 11.7 12/08/2022 04:00 AM    HCT 33.8 12/08/2022 04:00 AM    MCV 86.4 12/08/2022 04:00 AM    MCH 29.9 12/08/2022 04:00 AM    MCHC 34.6 12/08/2022 04:00 AM    RDW 11.6 12/08/2022 04:00 AM     12/08/2022 04:00 AM    MPV 8.8 12/08/2022 04:00 AM     CBC with Differential:    Lab Results   Component Value Date/Time    WBC 7.4 12/08/2022 04:00 AM    RBC 3.91 12/08/2022 04:00 AM    HGB 11.7 12/08/2022 04:00 AM    HCT 33.8 12/08/2022 04:00 AM     12/08/2022 04:00 AM    MCV 86.4 12/08/2022 04:00 AM    MCH 29.9 12/08/2022 04:00 AM    MCHC 34.6 12/08/2022 04:00 AM    RDW 11.6 12/08/2022 04:00 AM    LYMPHOPCT 27.1 12/08/2022 04:00 AM    MONOPCT 10.9 12/08/2022 04:00 AM    BASOPCT 0.3 12/08/2022 04:00 AM    MONOSABS 0.80 12/08/2022 04:00 AM    LYMPHSABS 1.99 12/08/2022 04:00 AM    EOSABS 0.10 12/08/2022 04:00 AM    BASOSABS 0.02 12/08/2022 04:00 AM     Hemoglobin/Hematocrit:    Lab Results   Component Value Date/Time    HGB 11.7 12/08/2022 04:00 AM    HCT 33.8 12/08/2022 04:00 AM     CMP:    Lab Results   Component Value Date/Time     12/08/2022 04:00 AM    K 3.8 12/08/2022 04:00 AM    K 3.6 12/06/2022 02:30 AM     12/08/2022 04:00 AM    CO2 27 12/08/2022 04:00 AM    BUN 8 12/08/2022 04:00 AM    CREATININE 1.0 12/08/2022 04:00 AM    LABGLOM >60 12/08/2022 04:00 AM    GLUCOSE 104 12/08/2022 04:00 AM    PROT 5.6 12/08/2022 04:00 AM    LABALBU 3.2 12/08/2022 04:00 AM    CALCIUM 8.5 12/08/2022 04:00 AM    BILITOT 0.5 12/08/2022 04:00 AM    ALKPHOS 51 12/08/2022 04:00 AM    AST 11 12/08/2022 04:00 AM    ALT 8 12/08/2022 04:00 AM     BMP:    Lab Results   Component Value Date/Time     12/08/2022 04:00 AM    K 3.8 12/08/2022 04:00 AM    K 3.6 12/06/2022 02:30 AM     12/08/2022 04:00 AM CO2 27 12/08/2022 04:00 AM    BUN 8 12/08/2022 04:00 AM    LABALBU 3.2 12/08/2022 04:00 AM    CREATININE 1.0 12/08/2022 04:00 AM    CALCIUM 8.5 12/08/2022 04:00 AM    LABGLOM >60 12/08/2022 04:00 AM    GLUCOSE 104 12/08/2022 04:00 AM     Hepatic Function Panel:    Lab Results   Component Value Date/Time    ALKPHOS 51 12/08/2022 04:00 AM    ALT 8 12/08/2022 04:00 AM    AST 11 12/08/2022 04:00 AM    PROT 5.6 12/08/2022 04:00 AM    BILITOT 0.5 12/08/2022 04:00 AM    BILIDIR <0.2 12/08/2022 04:00 AM    IBILI see below 12/08/2022 04:00 AM    LABALBU 3.2 12/08/2022 04:00 AM     Ionized Calcium:  No results found for: IONCA  Magnesium:    Lab Results   Component Value Date/Time    MG 1.8 12/06/2022 02:30 AM     Phosphorus:    Lab Results   Component Value Date/Time    PHOS 2.5 12/06/2022 02:30 AM     LDH:  No results found for: LDH  Uric Acid:    Lab Results   Component Value Date/Time    LABURIC 4.3 12/07/2022 02:30 AM     PT/INR:  No results found for: PROTIME, INR  Warfarin PT/INR:  No components found for: PTPATWAR, PTINRWAR  PTT:  No results found for: APTT, PTT[APTT}  Troponin:  No results found for: TROPONINI  Last 3 Troponin:  No results found for: TROPONINI  U/A:  No results found for: NITRITE, COLORU, PROTEINU, PHUR, LABCAST, WBCUA, RBCUA, MUCUS, TRICHOMONAS, YEAST, BACTERIA, CLARITYU, SPECGRAV, LEUKOCYTESUR, UROBILINOGEN, BILIRUBINUR, BLOODU, GLUCOSEU, AMORPHOUS  HgBA1c:    Lab Results   Component Value Date/Time    LABA1C 5.1 12/07/2022 02:30 AM     FLP:    Lab Results   Component Value Date/Time    TRIG 72 12/07/2022 02:30 AM    HDL 53 12/07/2022 02:30 AM    LDLCALC 69 12/07/2022 02:30 AM    LABVLDL 14 12/07/2022 02:30 AM     TSH:    Lab Results   Component Value Date/Time    TSH 2.230 12/07/2022 02:30 AM     VITAMIN B12: No components found for: B12  FOLATE:    Lab Results   Component Value Date/Time    FOLATE 5.2 12/07/2022 02:30 AM     IRON:    Lab Results   Component Value Date/Time    IRON 23 12/07/2022 02:30 AM     Iron Saturation:  No components found for: PERCENTFE  TIBC:    Lab Results   Component Value Date/Time    TIBC 150 12/07/2022 02:30 AM     FERRITIN:    Lab Results   Component Value Date/Time    FERRITIN 566 12/07/2022 02:30 AM        General appearance: Alert, Awake, Oriented times 3, no distress   Skin: Warm and dry ; no rashes  Head: Normocephalic. No masses, lesions or tenderness noted  Eyes: Conjunctivae pink, sclera white. PERRL,EOM-I  Ears: External ears normal  Nose/Sinuses: Nares normal. Septum midline. Mucosa normal. No drainage  Oropharynx: Oropharynx clear with no exudate seen  Neck: Supple. No jugular venous distension, lymphadenopathy or thyromegaly Trachea midline  Lungs: Clear to auscultation bilaterally. No rhonchi, crackles or wheezes  Heart: S1 S2  Regular rate and rhythm. No rub, gallop, murmur  Abdomen: Soft, non-tender. BS normal. No masses, organomegaly; no rebound or guarding  Extremities: Surgical dressing left hand and half his forearm; proximal forearm and humerus swelling much less than yesterday; arm elevated. Musculoskeletal: Muscular strength appears intact. Neuro:  No focal motor defects ; II-XII grossly intact . DANIEL equally        ASSESSMENT:   Principal Problem:    Cellulitis  Active Problems:    Dog bite, hand, left, initial encounter    Fracture of base of third metacarpal bone    Septic arthritis of hand, left (HCC)    Closed displaced fracture of third metacarpal bone of left hand  Resolved Problems:    * No resolved hospital problems.  *      PLAN:  SEE ORDERS      RE  CHANGES AND FINDINGS   Medications reviewed with patient  GI prophylaxis  DVT prophylaxis  Consultants notes reviewed  OR today for excision and/or debridement left hand  Unasyn 3 g IV every 6 hours  Anticoagulation on hold per orthopedics  Patient received diphtheria tetanus toxoid in the ED  Percocet 5/10 mg every 4 hours as needed for pain  Await results of deep cultures  12/8/2022  Await results of final cultures aerobic and anaerobic  Unasyn 3 g IV every 6 hours  Percocet 5/10 every 4 hours as needed for pain  Elevate arm  Monitor labs  Stool for C. difficile although seems unlikely  Ambulate rather than anticoagulate according to orthopedics          Discussed with patient and spouse and nursing. Maldonado Nagy DO     12:44 PM     12/8/2022    TIME > 35 MINUTES    >  50 %  OF  TIME  DISCUSSION               ------------  INFORMATION  -----------      DIET:ADULT DIET; Regular      No Known Allergies      MEDICATION SIDE EFFECTS:none       SCHEDULED MEDS:                                 Scheduled Meds:   pantoprazole  40 mg Oral QAM AC    sodium chloride flush  10 mL IntraVENous 2 times per day    enoxaparin  40 mg SubCUTAneous Daily    ampicillin-sulbactam  3,000 mg IntraVENous Q6H       Continuous Infusions:   sodium chloride 75 mL/hr at 12/06/22 2007    sodium chloride           Data:       Intake/Output Summary (Last 24 hours) at 12/8/2022 1244  Last data filed at 12/8/2022 0556  Gross per 24 hour   Intake 1880 ml   Output 600 ml   Net 1280 ml       Wt Readings from Last 3 Encounters:   12/08/22 195 lb (88.5 kg)   01/16/19 192 lb (87.1 kg)       Labs: Additional    GLUCOSE:No results for input(s): POCGLU in the last 72 hours. BNP:No results found for: BNP    CRP: No results for input(s): CRP in the last 72 hours. ESR:  Recent Labs     12/06/22  0230 12/07/22  0230 12/08/22  0400   SEDRATE 24* 32* 38*       RADIOLOGY: REVIEWED AVAILABLE REPORT  [facility] FOR SURGICAL PROCEDURES   Final Result   Intraprocedural fluoroscopic spot images as above. See separate procedure   report for more information. XR HAND LEFT (MIN 3 VIEWS)   Final Result   Small avulsion fragment adjacent to the 3rd metacarpal head. Soft tissue   swelling. Osteoarthritis.                    Tom Suárez DO    12:44 PM     12/8/2022      Voice recognition software used for dictation

## 2022-12-08 NOTE — CARE COORDINATION
New IV abt prescription written per ID services. Discussed options for receipt with patient. He desires to utilize the [address] [place] [address]. Script has been faxed to same. Will follow up with intake line in AM 12/9/2022 with hopes to get patient scheduled for initiation of IV therapy at [address] [place] [address] 12/10/2022. Isabel Nino.  Hawa, MSN RN  [facility] Case Management  [phone]

## 2022-12-08 NOTE — PLAN OF CARE
Problem: Pain  Goal: Verbalizes/displays adequate comfort level or baseline comfort level  12/7/2022 2359 by Ca Verma RN  Outcome: Progressing

## 2022-12-09 VITALS
WEIGHT: 206 LBS | RESPIRATION RATE: 18 BRPM | HEIGHT: 67 IN | TEMPERATURE: 98.7 F | HEART RATE: 71 BPM | OXYGEN SATURATION: 94 % | DIASTOLIC BLOOD PRESSURE: 71 MMHG | BODY MASS INDEX: 32.33 KG/M2 | SYSTOLIC BLOOD PRESSURE: 134 MMHG

## 2022-12-09 LAB
AFB SMEAR: NORMAL
ALBUMIN SERPL-MCNC: 3.2 G/DL (ref 3.5–5.2)
ALP BLD-CCNC: 43 U/L (ref 40–129)
ALT SERPL-CCNC: 10 U/L (ref 0–40)
ANAEROBIC CULTURE: NORMAL
ANION GAP SERPL CALCULATED.3IONS-SCNC: 9 MMOL/L (ref 7–16)
AST SERPL-CCNC: 11 U/L (ref 0–39)
BASOPHILS ABSOLUTE: 0.03 E9/L (ref 0–0.2)
BASOPHILS RELATIVE PERCENT: 0.5 % (ref 0–2)
BILIRUB SERPL-MCNC: 0.2 MG/DL (ref 0–1.2)
BILIRUBIN DIRECT: <0.2 MG/DL (ref 0–0.3)
BILIRUBIN, INDIRECT: ABNORMAL MG/DL (ref 0–1)
BUN BLDV-MCNC: 8 MG/DL (ref 6–20)
C-REACTIVE PROTEIN: 4.9 MG/DL (ref 0–0.4)
CALCIUM SERPL-MCNC: 8.7 MG/DL (ref 8.6–10.2)
CHLORIDE BLD-SCNC: 105 MMOL/L (ref 98–107)
CO2: 28 MMOL/L (ref 22–29)
CREAT SERPL-MCNC: 1 MG/DL (ref 0.7–1.2)
CULTURE SURGICAL: NORMAL
EOSINOPHILS ABSOLUTE: 0.16 E9/L (ref 0.05–0.5)
EOSINOPHILS RELATIVE PERCENT: 2.6 % (ref 0–6)
GFR SERPL CREATININE-BSD FRML MDRD: >60 ML/MIN/1.73
GLUCOSE BLD-MCNC: 107 MG/DL (ref 74–99)
HCT VFR BLD CALC: 34.4 % (ref 37–54)
HEMOGLOBIN: 12.1 G/DL (ref 12.5–16.5)
IMMATURE GRANULOCYTES #: 0.02 E9/L
IMMATURE GRANULOCYTES %: 0.3 % (ref 0–5)
LACTIC ACID: 0.7 MMOL/L (ref 0.5–2.2)
LYMPHOCYTES ABSOLUTE: 2.05 E9/L (ref 1.5–4)
LYMPHOCYTES RELATIVE PERCENT: 33.9 % (ref 20–42)
MAGNESIUM: 2.1 MG/DL (ref 1.6–2.6)
MCH RBC QN AUTO: 30.1 PG (ref 26–35)
MCHC RBC AUTO-ENTMCNC: 35.2 % (ref 32–34.5)
MCV RBC AUTO: 85.6 FL (ref 80–99.9)
MONOCYTES ABSOLUTE: 0.65 E9/L (ref 0.1–0.95)
MONOCYTES RELATIVE PERCENT: 10.8 % (ref 2–12)
NEUTROPHILS ABSOLUTE: 3.13 E9/L (ref 1.8–7.3)
NEUTROPHILS RELATIVE PERCENT: 51.9 % (ref 43–80)
PDW BLD-RTO: 11.4 FL (ref 11.5–15)
PHOSPHORUS: 4.2 MG/DL (ref 2.5–4.5)
PLATELET # BLD: 298 E9/L (ref 130–450)
PMV BLD AUTO: 8.8 FL (ref 7–12)
POTASSIUM SERPL-SCNC: 3.9 MMOL/L (ref 3.5–5)
RBC # BLD: 4.02 E12/L (ref 3.8–5.8)
SEDIMENTATION RATE, ERYTHROCYTE: 45 MM/HR (ref 0–15)
SODIUM BLD-SCNC: 142 MMOL/L (ref 132–146)
TOTAL PROTEIN: 5.7 G/DL (ref 6.4–8.3)
WBC # BLD: 6 E9/L (ref 4.5–11.5)

## 2022-12-09 PROCEDURE — 85651 RBC SED RATE NONAUTOMATED: CPT

## 2022-12-09 PROCEDURE — 05HY33Z INSERTION OF INFUSION DEVICE INTO UPPER VEIN, PERCUTANEOUS APPROACH: ICD-10-PCS | Performed by: ORTHOPAEDIC SURGERY

## 2022-12-09 PROCEDURE — A4216 STERILE WATER/SALINE, 10 ML: HCPCS

## 2022-12-09 PROCEDURE — 85025 COMPLETE CBC W/AUTO DIFF WBC: CPT

## 2022-12-09 PROCEDURE — 36569 INSJ PICC 5 YR+ W/O IMAGING: CPT

## 2022-12-09 PROCEDURE — 36415 COLL VENOUS BLD VENIPUNCTURE: CPT

## 2022-12-09 PROCEDURE — 2580000003 HC RX 258

## 2022-12-09 PROCEDURE — 80048 BASIC METABOLIC PNL TOTAL CA: CPT

## 2022-12-09 PROCEDURE — 6360000002 HC RX W HCPCS

## 2022-12-09 PROCEDURE — 76937 US GUIDE VASCULAR ACCESS: CPT

## 2022-12-09 PROCEDURE — 6370000000 HC RX 637 (ALT 250 FOR IP): Performed by: PHYSICIAN ASSISTANT

## 2022-12-09 PROCEDURE — 2580000003 HC RX 258: Performed by: PHYSICIAN ASSISTANT

## 2022-12-09 PROCEDURE — 83735 ASSAY OF MAGNESIUM: CPT

## 2022-12-09 PROCEDURE — 2500000003 HC RX 250 WO HCPCS

## 2022-12-09 PROCEDURE — 86140 C-REACTIVE PROTEIN: CPT

## 2022-12-09 PROCEDURE — 84100 ASSAY OF PHOSPHORUS: CPT

## 2022-12-09 PROCEDURE — 80076 HEPATIC FUNCTION PANEL: CPT

## 2022-12-09 PROCEDURE — C1751 CATH, INF, PER/CENT/MIDLINE: HCPCS

## 2022-12-09 PROCEDURE — 83605 ASSAY OF LACTIC ACID: CPT

## 2022-12-09 RX ORDER — SODIUM CHLORIDE 0.9 % (FLUSH) 0.9 %
5-40 SYRINGE (ML) INJECTION PRN
Status: CANCELLED | OUTPATIENT
Start: 2022-12-10

## 2022-12-09 RX ADMIN — ERTAPENEM 1000 MG: 1 INJECTION INTRAMUSCULAR; INTRAVENOUS at 16:34

## 2022-12-09 RX ADMIN — LIDOCAINE HYDROCHLORIDE 1 ML: 10 SOLUTION INTRAVENOUS at 15:53

## 2022-12-09 RX ADMIN — PANTOPRAZOLE SODIUM 40 MG: 40 TABLET, DELAYED RELEASE ORAL at 05:22

## 2022-12-09 RX ADMIN — SODIUM CHLORIDE, PRESERVATIVE FREE 10 ML: 5 INJECTION INTRAVENOUS at 09:55

## 2022-12-09 RX ADMIN — OXYCODONE AND ACETAMINOPHEN 1 TABLET: 5; 325 TABLET ORAL at 05:22

## 2022-12-09 ASSESSMENT — PAIN SCALES - GENERAL: PAINLEVEL_OUTOF10: 5

## 2022-12-09 ASSESSMENT — PAIN DESCRIPTION - DESCRIPTORS: DESCRIPTORS: DULL

## 2022-12-09 ASSESSMENT — PAIN DESCRIPTION - ORIENTATION: ORIENTATION: LEFT

## 2022-12-09 ASSESSMENT — PAIN DESCRIPTION - LOCATION: LOCATION: ARM;HAND

## 2022-12-09 NOTE — PROGRESS NOTES
PROGRESS  NOTE --                                                          INTERNAL  MEDICINE                                                                              I  PERSONALLY SAW , EXAMINED, AND CARED [address] [facility], 12/9/2022     LABS, XRAY ,CHART, AND MEDICATIONS  REVIEWED BY ME       Chief complaint: Animal bite left hand, dog      12/7/2022-SUBJECTIVE: Emma Chua is alert awake and cooperative; oriented ×3. Denies any chest pain dyspnea nausea emesis. Tolerating diet. No abdominal pain. Wife present through the exam; patient just returned from the OR. He is noticing more edema of his proximal forearm and now his distal humerus area. That was not present yesterday. Pain controlled. I discussed with him the need to wait until surgical cultures become available which may take 24 to 48 hours or more. Also discussed with him possible need for PICC line and IV antibiotics post hospital.  Temperature has been consistently above 99 °F.  Most recent 99.1 highest 99. Blood pressure 144/74. SPO2 96 on room air. End date and output +455 cc. Procalcitonin elevated 0.11. Fasting glucose 115 protein 5.9. LDL 69 albumin 3.4 liver functions normal.  A1c 5.1 TSH 2.230. WBC normal again at 7.7 hemoglobin 12.5. ESR 32. ASO titer elevated at 214. Ferritin 566, iron iron saturation TIBC all low. ID consult from yesterday appreciated. No cultures were drawn, blood nor wound prior to initiation of Unasyn 3 g every 6 hours; those culture should have been obtained in the ED prior to his admission. Continue Unasyn 3 g IV every 6 hours. Consultation for hand surgery from yesterday appreciated. Patient is right-hand dominant, works in waste water treatment. Impression : dog bite left hand with underlying fracture of metacarpal head and probable septic arthritis of third metacarpal phalangeal joint.   Plan excisional debridement.  note from today appreciated. If needed patient is agreeable to infusion center for IV antibiotics or home health care. 12/8/2022-patient sitting in bedside chair, arm elevated on foam padding. Pain well controlled. No chest pain no dyspnea. States he is having some loose bowels but \"I always get loose bowels while on antibiotics\". Bowel movements 2-3 times a day. No urinary problems appetite good. Highest temperature last 24 hours 100.1. Currently 98.8. SPO2 98 on room air. Intake and output +1735 cc. Fasting glucose 104. Calcium 8.5 ionized calcium from yesterday normal at 1.29. Protein 5.6 albumin 3.2. LDL 69. Liver functions normal.  Hemoglobin 11.7 WBC 7.4. ESR 38. Anaerobic cultures pending. Surgical culture growth not present incubation continues. Gram stain on unspun fluid and rare polymorphonuclear leukocytes, epithelial cells not seen, no organisms seen. Official operative note from yesterday as follows--    DATE OF PROCEDURE:  12/07/2022     PREOPERATIVE DIAGNOSES:  1. Left hand septic arthritis, metatarsophalangeal joint. 2.  Left third metacarpal fracture. POSTOPERATIVE DIAGNOSES:  1. Left middle finger metatarsophalangeal joint septic arthritis. 2.  Left middle finger fracture with tooth injury to the metacarpal  head. 3.  Partial sagittal band injury, middle finger. SURGERY PERFORMED:  1. Excisional debridement, left middle finger wound including skin,  subcutaneous tissues, deep fascia, capsule, and bone. 2.  Left hand fluoroscopic examination under anesthesia. ANESTHESIA:  1. Monitored anesthesia care. 2.  Local anesthetic by surgeon consisting of approximately 10 mL of 1%  lidocaine plain. SURGEON:  Holli Garcia MD.     ASSISTANT:  Maral Foley, physician assistant certified. She was  present throughout the procedure.   Her assistance was used for  preoperative positioning, intraoperative retraction, closure, and  dressing application. Her assistance expedited the case and decreased  the surgical time. An orthopedic surgery resident was unavailable for  case coverage at the time of surgery. TOTAL TOURNIQUET TIME:  13 minutes at 250 mmHg brachial tourniquet. ESTIMATED BLOOD LOSS:  Minimal.     FINDINGS:  1.  Gross purulence involving the metacarpophalangeal joint. There is  direct connection of the skin laceration into the joint involving the  joint capsule as well as a large tooth indentation in the metacarpal  head. 2.  Status post excisional debridement, copious irrigation of the  remaining tissues appeared to be healthy and viable. SPECIMENS:  1. Deep tissue was sent for culture. 2.  Culture swabs were obtained and sent for culture. DISPOSITION:  The patient remained stable throughout the procedure. OPERATIVE INDICATIONS:  The patient is a very pleasant 51-year-old  gentleman who sustained a dog bite injury to his left hand. He delayed  treatment and developed worsening pain and swelling. He ultimately  presented to the Emergency Department and was admitted and started on IV  antibiotics without any significant improvement in his symptomatology. Risks, benefits, alternatives, and complications of surgical  intervention were explained to him including, but not limited to the  risk of continued or worsening infection, damage to nerves, vessels, or  tendons, wound healing complications, stiffness, possible need for  additional surgery, therapy, as well as unforeseen complications. He  voiced understanding and wished to proceed. DESCRIPTION OF PROCEDURE:  The patient was identified in the holding  area. The left hand was identified as the surgical site. He was then  seen by Anesthesia, taken to the operating room, placed supine on the  table, and underwent monitored anesthesia care per Anesthesia  Department. All bony prominences were well padded.   A well-padded arm  tourniquet was be inserted. He will attend infusion center. 12/9/2022-patient seen walking around the room, pain in left hand much less. No chest pain no dyspnea appetite good loose bowels have improved. Stool for C. difficile was rejected. He is hoping for discharge today; simply awaiting PICC line insertion. He would like to return to light duty at work as of 12/12/2022. He needs note written for same. Highest temperature last 24 hours 99.2. Currently 98.7. SPO2 94 on room air. Intake and output +2335 cc. Fasting glucose 107 protein 5.7 albumin 3.2 liver functions normal.  Hemoglobin 12.1 WBC 6.0. ESR 35. Anaerobes not isolated 48 hours, incubation continues. No AFB.         Objective:     PHYSICAL EXAM:    VS: /71   Pulse 71   Temp 98.7 °F (37.1 °C) (Oral)   Resp 18   Ht 5' 7\" (1.702 m)   Wt 206 lb (93.4 kg)   SpO2 94%   BMI 32.26 kg/m²     Labs:   CBC:   Lab Results   Component Value Date/Time    WBC 6.0 12/09/2022 04:59 AM    RBC 4.02 12/09/2022 04:59 AM    HGB 12.1 12/09/2022 04:59 AM    HCT 34.4 12/09/2022 04:59 AM    MCV 85.6 12/09/2022 04:59 AM    MCH 30.1 12/09/2022 04:59 AM    MCHC 35.2 12/09/2022 04:59 AM    RDW 11.4 12/09/2022 04:59 AM     12/09/2022 04:59 AM    MPV 8.8 12/09/2022 04:59 AM     CBC with Differential:    Lab Results   Component Value Date/Time    WBC 6.0 12/09/2022 04:59 AM    RBC 4.02 12/09/2022 04:59 AM    HGB 12.1 12/09/2022 04:59 AM    HCT 34.4 12/09/2022 04:59 AM     12/09/2022 04:59 AM    MCV 85.6 12/09/2022 04:59 AM    MCH 30.1 12/09/2022 04:59 AM    MCHC 35.2 12/09/2022 04:59 AM    RDW 11.4 12/09/2022 04:59 AM    LYMPHOPCT 33.9 12/09/2022 04:59 AM    MONOPCT 10.8 12/09/2022 04:59 AM    BASOPCT 0.5 12/09/2022 04:59 AM    MONOSABS 0.65 12/09/2022 04:59 AM    LYMPHSABS 2.05 12/09/2022 04:59 AM    EOSABS 0.16 12/09/2022 04:59 AM    BASOSABS 0.03 12/09/2022 04:59 AM     Hemoglobin/Hematocrit:    Lab Results   Component Value Date/Time    HGB 12.1 12/09/2022 04:59 AM    HCT 34.4 12/09/2022 04:59 AM     CMP:    Lab Results   Component Value Date/Time     12/09/2022 05:30 AM    K 3.9 12/09/2022 05:30 AM    K 3.6 12/06/2022 02:30 AM     12/09/2022 05:30 AM    CO2 28 12/09/2022 05:30 AM    BUN 8 12/09/2022 05:30 AM    CREATININE 1.0 12/09/2022 05:30 AM    LABGLOM >60 12/09/2022 05:30 AM    GLUCOSE 107 12/09/2022 05:30 AM    PROT 5.7 12/09/2022 05:30 AM    LABALBU 3.2 12/09/2022 05:30 AM    CALCIUM 8.7 12/09/2022 05:30 AM    BILITOT 0.2 12/09/2022 05:30 AM    ALKPHOS 43 12/09/2022 05:30 AM    AST 11 12/09/2022 05:30 AM    ALT 10 12/09/2022 05:30 AM     BMP:    Lab Results   Component Value Date/Time     12/09/2022 05:30 AM    K 3.9 12/09/2022 05:30 AM    K 3.6 12/06/2022 02:30 AM     12/09/2022 05:30 AM    CO2 28 12/09/2022 05:30 AM    BUN 8 12/09/2022 05:30 AM    LABALBU 3.2 12/09/2022 05:30 AM    CREATININE 1.0 12/09/2022 05:30 AM    CALCIUM 8.7 12/09/2022 05:30 AM    LABGLOM >60 12/09/2022 05:30 AM    GLUCOSE 107 12/09/2022 05:30 AM     Hepatic Function Panel:    Lab Results   Component Value Date/Time    ALKPHOS 43 12/09/2022 05:30 AM    ALT 10 12/09/2022 05:30 AM    AST 11 12/09/2022 05:30 AM    PROT 5.7 12/09/2022 05:30 AM    BILITOT 0.2 12/09/2022 05:30 AM    BILIDIR <0.2 12/09/2022 05:30 AM    IBILI see below 12/09/2022 05:30 AM    LABALBU 3.2 12/09/2022 05:30 AM     Ionized Calcium:  No results found for: IONCA  Magnesium:    Lab Results   Component Value Date/Time    MG 1.8 12/08/2022 04:00 AM     Phosphorus:    Lab Results   Component Value Date/Time    PHOS 4.0 12/08/2022 04:00 AM     LDH:  No results found for: LDH  Uric Acid:    Lab Results   Component Value Date/Time    LABURIC 4.3 12/07/2022 02:30 AM     PT/INR:  No results found for: PROTIME, INR  Warfarin PT/INR:  No components found for: PTPATWAR, PTINRWAR  PTT:  No results found for: APTT, PTT[APTT}  Troponin:  No results found for: TROPONINI  Last 3 Troponin:  No results found for: TROPONINI  U/A:  No results found for: NITRITE, COLORU, PROTEINU, PHUR, LABCAST, WBCUA, RBCUA, MUCUS, TRICHOMONAS, YEAST, BACTERIA, CLARITYU, SPECGRAV, LEUKOCYTESUR, UROBILINOGEN, BILIRUBINUR, BLOODU, GLUCOSEU, AMORPHOUS  HgBA1c:    Lab Results   Component Value Date/Time    LABA1C 5.1 12/07/2022 02:30 AM     FLP:    Lab Results   Component Value Date/Time    TRIG 72 12/07/2022 02:30 AM    HDL 53 12/07/2022 02:30 AM    LDLCALC 69 12/07/2022 02:30 AM    LABVLDL 14 12/07/2022 02:30 AM     TSH:    Lab Results   Component Value Date/Time    TSH 2.230 12/07/2022 02:30 AM     VITAMIN B12: No components found for: B12  FOLATE:    Lab Results   Component Value Date/Time    FOLATE 5.2 12/07/2022 02:30 AM     IRON:    Lab Results   Component Value Date/Time    IRON 23 12/07/2022 02:30 AM     Iron Saturation:  No components found for: PERCENTFE  TIBC:    Lab Results   Component Value Date/Time    TIBC 150 12/07/2022 02:30 AM     FERRITIN:    Lab Results   Component Value Date/Time    FERRITIN 566 12/07/2022 02:30 AM        General appearance: Alert, Awake, Oriented times 3, no distress   Skin: Warm and dry ; no rashes  Head: Normocephalic. No masses, lesions or tenderness noted  Eyes: Conjunctivae pink, sclera white. PERRL,EOM-I  Ears: External ears normal  Nose/Sinuses: Nares normal. Septum midline. Mucosa normal. No drainage  Oropharynx: Oropharynx clear with no exudate seen  Neck: Supple. No jugular venous distension, lymphadenopathy or thyromegaly Trachea midline  Lungs: Clear to auscultation bilaterally. No rhonchi, crackles or wheezes  Heart: S1 S2  Regular rate and rhythm. No rub, gallop, murmur  Abdomen: Soft, non-tender. BS normal. No masses, organomegaly; no rebound or guarding  Extremities: Surgical dressing left hand and half his forearm; proximal forearm and humerus swelling much less than yesterday; arm elevated. Musculoskeletal: Muscular strength appears intact.    Neuro:  No focal motor defects ; II-XII grossly intact . DANIEL equally        ASSESSMENT:   Principal Problem:    Cellulitis  Active Problems:    Dog bite, hand, left, initial encounter    Fracture of base of third metacarpal bone    Septic arthritis of hand, left (HCC)    Closed displaced fracture of third metacarpal bone of left hand  Resolved Problems:    * No resolved hospital problems. *      PLAN:  SEE ORDERS      RE  CHANGES AND FINDINGS   Medications reviewed with patient  GI prophylaxis  DVT prophylaxis  Consultants notes reviewed  OR today for excision and/or debridement left hand  Unasyn 3 g IV every 6 hours  Anticoagulation on hold per orthopedics  Patient received diphtheria tetanus toxoid in the ED  Percocet 5/10 mg every 4 hours as needed for pain  Await results of deep cultures  12/8/2022  Await results of final cultures aerobic and anaerobic  Unasyn 3 g IV every 6 hours  Percocet 5/10 every 4 hours as needed for pain  Elevate arm  Monitor labs  Stool for C. difficile although seems unlikely  Ambulate rather than anticoagulate according to orthopedics  12/9/2022  Med reconciliation completed  Prescriptions written  May return to light duty 12/12/2022  Follow-up PCP 1 week  Unasyn has been discontinued  Ertapenem 1 g IV every 24 hours, at the infusion center  Percocet 5 mg, every 6 hours as needed for pain; prescription for #28 by orthopedics  Follow-up with ID per their instructions  Follow-up with orthopedics per their instruction          Discussed with patient and spouse and nursing. Brooke Nagy DO     1:04 PM     12/9/2022    TIME > 35 MINUTES    >  50 %  OF  TIME  DISCUSSION               ------------  INFORMATION  -----------      DIET:ADULT DIET;  Regular      No Known Allergies      MEDICATION SIDE EFFECTS:none       SCHEDULED MEDS:                                 Scheduled Meds:   lidocaine PF  5 mL IntraDERmal Once    sodium chloride flush  5-40 mL IntraVENous 2 times per day    heparin flush  3 mL IntraVENous 2 times per day    ertapenem Alexander Lennon) IVPB  1,000 mg IntraVENous Q24H    pantoprazole  40 mg Oral QAM AC    sodium chloride flush  10 mL IntraVENous 2 times per day    enoxaparin  40 mg SubCUTAneous Daily       Continuous Infusions:   sodium chloride           Data:       Intake/Output Summary (Last 24 hours) at 12/9/2022 1304  Last data filed at 12/9/2022 0832  Gross per 24 hour   Intake 180 ml   Output --   Net 180 ml       Wt Readings from Last 3 Encounters:   12/09/22 206 lb (93.4 kg)   01/16/19 192 lb (87.1 kg)       Labs: Additional    GLUCOSE:No results for input(s): POCGLU in the last 72 hours. BNP:No results found for: BNP    CRP:   Recent Labs     12/08/22  0400   CRP 6.3*       ESR:  Recent Labs     12/07/22  0230 12/08/22  0400 12/09/22  0459   SEDRATE 32* 38* 45*       RADIOLOGY: REVIEWED AVAILABLE REPORT  [facility] FOR SURGICAL PROCEDURES   Final Result   Intraprocedural fluoroscopic spot images as above. See separate procedure   report for more information. XR HAND LEFT (MIN 3 VIEWS)   Final Result   Small avulsion fragment adjacent to the 3rd metacarpal head. Soft tissue   swelling. Osteoarthritis.                    [address],     1:04 PM     12/9/2022      Voice recognition software used for dictation

## 2022-12-09 NOTE — PROCEDURES
PICC    Catheter insertion date: 12/9/2022     Product Number:  [address] [facility] [employer]   Lot No: [id number]   Gauge: 17   Lumen: single   L Basilic    Vein Diameter: 0.58cm   Arm circumference at insertion site: 34cm   Catheter Length: 46cm   Internal Length: 46cm   External Catheter Length: 0cm   Ultrasound Used: yes  VPS Blue [place] confirms PICC tip is placed in the lower 1/3 of the SVC or at the Cavoatrial junction. Floor nurse notified PICC is okay to use.    : Renée Lindsey RN [facility]

## 2022-12-09 NOTE — PROGRESS NOTES
[address] [place] [address] [facility]  MAEIDA  Progress Note    SUBJECTIVE:  Chief Complaint   Patient presents with    Animal Bite     Left hand on Saturday. Not owner of dog, seen at urgent care and sent in for IV abx. Patient is tolerating medications. No reported adverse drug reactions. No nausea, vomiting, diarrhea. In bed eating lunch  Waiting for PICC line placement  Hopeful to go home today   Feeling well    Review of systems:  As stated above in the chief complaint, otherwise negative. Medications:  Scheduled Meds:   lidocaine PF  5 mL IntraDERmal Once    sodium chloride flush  5-40 mL IntraVENous 2 times per day    heparin flush  3 mL IntraVENous 2 times per day    ertapenem (INVanz) IVPB  1,000 mg IntraVENous Q24H    pantoprazole  40 mg Oral QAM AC    sodium chloride flush  10 mL IntraVENous 2 times per day    enoxaparin  40 mg SubCUTAneous Daily     Continuous Infusions:   sodium chloride       PRN Meds:sodium chloride flush, sodium chloride, heparin flush, oxyCODONE-acetaminophen **OR** oxyCODONE-acetaminophen, potassium chloride **OR** potassium alternative oral replacement **OR** potassium chloride, ibuprofen, sodium chloride flush, ondansetron **OR** ondansetron, magnesium hydroxide, acetaminophen **OR** acetaminophen    OBJECTIVE:  /71   Pulse 71   Temp 98.7 °F (37.1 °C) (Oral)   Resp 18   Ht 5' 7\" (1.702 m)   Wt 206 lb (93.4 kg)   SpO2 94%   BMI 32.26 kg/m²   Temp  Av.9 °F (37.2 °C)  Min: 98.7 °F (37.1 °C)  Max: 99.1 °F (37.3 °C)  Constitutional: The patient is awake, alert, and oriented. In bed. In no distress. Eating lunch. Skin: Warm and dry. No rashes were noted. HEENT: Round and reactive pupils. Moist mucous membranes. No ulcerations or thrush. Neck: Supple to movements. Chest: No use of accessory muscles to breathe. Symmetrical expansion. No wheezing, crackles or rhonchi. Cardiovascular: S1 and S2 are rhythmic and regular. No murmurs appreciated. Abdomen: Positive bowel sounds to auscultation. Benign to palpation. Extremities: Left hand wrapped in ACE dressing. Lines: peripheral    Laboratory and Tests Review:  Lab Results   Component Value Date    WBC 6.0 12/09/2022    WBC 7.4 12/08/2022    WBC 7.7 12/07/2022    HGB 12.1 (L) 12/09/2022    HCT 34.4 (L) 12/09/2022    MCV 85.6 12/09/2022     12/09/2022     Lab Results   Component Value Date    NEUTROABS 3.13 12/09/2022    NEUTROABS 4.42 12/08/2022    NEUTROABS 4.67 12/07/2022     No results found for: CRPHS  Lab Results   Component Value Date    ALT 10 12/09/2022    AST 11 12/09/2022    ALKPHOS 43 12/09/2022    BILITOT 0.2 12/09/2022     Lab Results   Component Value Date/Time     12/09/2022 05:30 AM    K 3.9 12/09/2022 05:30 AM    K 3.6 12/06/2022 02:30 AM     12/09/2022 05:30 AM    CO2 28 12/09/2022 05:30 AM    BUN 8 12/09/2022 05:30 AM    CREATININE 1.0 12/09/2022 05:30 AM    CREATININE 1.0 12/08/2022 04:00 AM    CREATININE 1.1 12/07/2022 02:30 AM    LABGLOM >60 12/09/2022 05:30 AM    GLUCOSE 107 12/09/2022 05:30 AM    PROT 5.7 12/09/2022 05:30 AM    LABALBU 3.2 12/09/2022 05:30 AM    CALCIUM 8.7 12/09/2022 05:30 AM    BILITOT 0.2 12/09/2022 05:30 AM    ALKPHOS 43 12/09/2022 05:30 AM    AST 11 12/09/2022 05:30 AM    ALT 10 12/09/2022 05:30 AM     Lab Results   Component Value Date    CRP 6.3 (H) 12/08/2022    CRP 10.5 (H) 12/06/2022     Lab Results   Component Value Date    SEDRATE 45 (H) 12/09/2022    SEDRATE 38 (H) 12/08/2022    SEDRATE 32 (H) 12/07/2022     Radiology:      Microbiology:   Surgical Culture 12/7/22: negative so far     No results for input(s): PROCAL in the last 72 hours. ASSESSMENT:  Left hand cellulitis/MT osteomyelitis from dog bite: s/p left middle finger excisional debridement 12/7/22, reviewed op note. It went into the MT/phalageal joint. There was bite jesus manuel on the MT bone.      PLAN:  Continue IV Ertapenem - planning for 4 weeks, script in chart   PICC line ordered - awaiting placement  Monitor labs  DC plan: Home with [address] [facility] infusion center   F/U with Dr. Alberto Gilmore to DC from ID POV, when PICC is inserted and antibiotics are set up     VITA Small CNP  10:12 AM  12/9/2022       Pt seen and examined. Above discussed agree with advanced practice nurse. Labs, cultures, and radiographs reviewed. Face to Face encounter occurred. Changes made as necessary.      Elizabeth Wyman MD

## 2022-12-09 NOTE — PROGRESS NOTES
Infectious control nurse called and stated cdiff stool was rejected and to discontinue isolation at this time.  Electronically signed by Roger Bush RN on 12/9/2022 at 10:03 AM

## 2022-12-09 NOTE — PROGRESS NOTES
P Quality Flow/Interdisciplinary Rounds Progress Note        Quality Flow Rounds held on December 9, 2022    Disciplines Attending:  Bedside Nurse, , , and Nursing Unit Leadership    Jeny Murdock was admitted on 12/5/2022 12:42 PM    Anticipated Discharge Date:       Disposition:    Roberto Score:  Roberto Scale Score: 22    Readmission Risk              Risk of Unplanned Readmission:  7           Discussed patient goal for the day, patient clinical progression, and barriers to discharge.   The following Goal(s) of the Day/Commitment(s) have been identified:  Diagnostics - Report Results and Labs - Report Results, DC planning      Roger Bush RN  December 9, 2022

## 2022-12-09 NOTE — CARE COORDINATION
Referral called to [phone] and script faxed to same. Patient has been placed on schedule as follows  Saturday 12/10/22 and Sunday 12/11/22 - appointment time 930 AM Monday 12/12/22 appointment time 2 PM    Await PICC insertion  Shabnam Davila.  Hawa, MSN RN  [facility] Case Management  [phone]

## 2022-12-10 ENCOUNTER — HOSPITAL ENCOUNTER (OUTPATIENT)
Dept: INFUSION THERAPY | Age: 52
Setting detail: INFUSION SERIES
Discharge: HOME | End: 2022-12-10
Payer: COMMERCIAL

## 2022-12-10 VITALS
RESPIRATION RATE: 16 BRPM | TEMPERATURE: 97.6 F | HEART RATE: 75 BPM | WEIGHT: 190 LBS | BODY MASS INDEX: 29.82 KG/M2 | OXYGEN SATURATION: 98 % | SYSTOLIC BLOOD PRESSURE: 127 MMHG | DIASTOLIC BLOOD PRESSURE: 78 MMHG | HEIGHT: 67 IN

## 2022-12-10 DIAGNOSIS — M00.9 PYOGENIC ARTHRITIS OF LEFT HAND, DUE TO UNSPECIFIED ORGANISM (HCC): Primary | ICD-10-CM

## 2022-12-10 LAB — FUNGUS STAIN: NORMAL

## 2022-12-10 PROCEDURE — A4216 STERILE WATER/SALINE, 10 ML: HCPCS

## 2022-12-10 PROCEDURE — 6360000002 HC RX W HCPCS

## 2022-12-10 PROCEDURE — 96374 THER/PROPH/DIAG INJ IV PUSH: CPT

## 2022-12-10 PROCEDURE — 2580000003 HC RX 258

## 2022-12-10 RX ORDER — SODIUM CHLORIDE 0.9 % (FLUSH) 0.9 %
5-40 SYRINGE (ML) INJECTION PRN
Status: CANCELLED | OUTPATIENT
Start: 2022-12-11

## 2022-12-10 RX ORDER — HEPARIN SODIUM (PORCINE) LOCK FLUSH IV SOLN 100 UNIT/ML 100 UNIT/ML
300 SOLUTION INTRAVENOUS PRN
Status: DISCONTINUED | OUTPATIENT
Start: 2022-12-10 | End: 2022-12-11 | Stop reason: HOSPADM

## 2022-12-10 RX ORDER — SODIUM CHLORIDE 0.9 % (FLUSH) 0.9 %
5-40 SYRINGE (ML) INJECTION PRN
Status: DISCONTINUED | OUTPATIENT
Start: 2022-12-10 | End: 2022-12-11 | Stop reason: HOSPADM

## 2022-12-10 RX ADMIN — ERTAPENEM SODIUM 1000 MG: 1 INJECTION, POWDER, LYOPHILIZED, FOR SOLUTION INTRAMUSCULAR; INTRAVENOUS at 09:31

## 2022-12-10 RX ADMIN — SODIUM CHLORIDE, PRESERVATIVE FREE 10 ML: 5 INJECTION INTRAVENOUS at 09:30

## 2022-12-10 RX ADMIN — Medication 300 UNITS: at 09:36

## 2022-12-10 RX ADMIN — SODIUM CHLORIDE, PRESERVATIVE FREE 10 ML: 5 INJECTION INTRAVENOUS at 09:36

## 2022-12-11 ENCOUNTER — HOSPITAL ENCOUNTER (OUTPATIENT)
Dept: INFUSION THERAPY | Age: 52
Setting detail: INFUSION SERIES
Discharge: HOME | End: 2022-12-11
Payer: COMMERCIAL

## 2022-12-11 VITALS
DIASTOLIC BLOOD PRESSURE: 73 MMHG | WEIGHT: 190 LBS | TEMPERATURE: 97.4 F | OXYGEN SATURATION: 100 % | HEIGHT: 67 IN | SYSTOLIC BLOOD PRESSURE: 124 MMHG | BODY MASS INDEX: 29.82 KG/M2 | HEART RATE: 56 BPM | RESPIRATION RATE: 16 BRPM

## 2022-12-11 DIAGNOSIS — M00.9 PYOGENIC ARTHRITIS OF LEFT HAND, DUE TO UNSPECIFIED ORGANISM (HCC): Primary | ICD-10-CM

## 2022-12-11 PROCEDURE — 96374 THER/PROPH/DIAG INJ IV PUSH: CPT

## 2022-12-11 PROCEDURE — 6360000002 HC RX W HCPCS

## 2022-12-11 PROCEDURE — A4216 STERILE WATER/SALINE, 10 ML: HCPCS

## 2022-12-11 PROCEDURE — 2580000003 HC RX 258

## 2022-12-11 RX ORDER — SODIUM CHLORIDE 0.9 % (FLUSH) 0.9 %
5-40 SYRINGE (ML) INJECTION PRN
Status: CANCELLED | OUTPATIENT
Start: 2022-12-12

## 2022-12-11 RX ORDER — SODIUM CHLORIDE 0.9 % (FLUSH) 0.9 %
5-40 SYRINGE (ML) INJECTION PRN
Status: DISCONTINUED | OUTPATIENT
Start: 2022-12-11 | End: 2022-12-12 | Stop reason: HOSPADM

## 2022-12-11 RX ORDER — HEPARIN SODIUM (PORCINE) LOCK FLUSH IV SOLN 100 UNIT/ML 100 UNIT/ML
300 SOLUTION INTRAVENOUS PRN
Status: DISCONTINUED | OUTPATIENT
Start: 2022-12-11 | End: 2022-12-12 | Stop reason: HOSPADM

## 2022-12-11 RX ADMIN — SODIUM CHLORIDE, PRESERVATIVE FREE 10 ML: 5 INJECTION INTRAVENOUS at 09:31

## 2022-12-11 RX ADMIN — ERTAPENEM SODIUM 1000 MG: 1 INJECTION, POWDER, LYOPHILIZED, FOR SOLUTION INTRAMUSCULAR; INTRAVENOUS at 09:25

## 2022-12-11 RX ADMIN — SODIUM CHLORIDE, PRESERVATIVE FREE 10 ML: 5 INJECTION INTRAVENOUS at 09:23

## 2022-12-11 RX ADMIN — Medication 300 UNITS: at 09:37

## 2022-12-11 NOTE — DISCHARGE SUMMARY
DISCHARGE SUMMARY  Patient ID:  Marcos Campbell  [id number]  21 y.o.  1970    Admit date: 2022      Discharge date : 2022      Admission Diagnoses: Cellulitis [L03.90]  Cellulitis of left upper extremity [L03.114]  Dog bite, initial encounter [K07. 0XXA]  Open avulsion fracture of shaft of metacarpal bone, initial encounter [S62.329B]    Discharge Diagnosis  Principal Problem:    Cellulitis  Active Problems:    Dog bite, hand, left, initial encounter    Fracture of base of third metacarpal bone    Septic arthritis of hand, left (HCC)    Closed displaced fracture of third metacarpal bone of left hand  Resolved Problems:    * No resolved hospital problems. *      Hospital Course:    CHIEF COMPLAINT: Animal bite left hand     History of Present Illness: 21-year-old male patient of Dr. Palmer Sensor III I am asked to admit and follow. History obtained from patient, wife was present. Presented to the ED due to pain and swelling of his left hand. He states he had a dog bite that occurred 12/3/2022 to his left hand. The dog owner is known; Department of Health has been contacted. Dog is up-to-date on rabies. Patient developed progressive worsening of swelling and erythema of the left hand, holds fingers in a flexed position. --Past medical history is negative for rheumatic fever scarlet fever polio diphtheria cancer diabetes hypertension  --Patient smokes occasional cigar  --Patient's mother  age 59 cerebral aneurysm; father alive age 68 severe arthritis prior alcoholic  --Denies any previous pneumonia but does get bronchitis every 3 to 4 years. No history of asthma no shortness of breath. Denies any palpitations chest pain. Denies any recurring indigestion heartburn gas blood in stool blood in urine kidney stones kidney infections. 2022-SUBJECTIVE: Marcos Campbell is alert awake and cooperative; oriented ×3. Denies any chest pain dyspnea nausea emesis. Tolerating diet.  No abdominal pain. Wife present through the exam; patient just returned from the OR. He is noticing more edema of his proximal forearm and now his distal humerus area. That was not present yesterday. Pain controlled. I discussed with him the need to wait until surgical cultures become available which may take 24 to 48 hours or more. Also discussed with him possible need for PICC line and IV antibiotics post hospital.  Temperature has been consistently above 99 °F.  Most recent 99.1 highest 99. Blood pressure 144/74. SPO2 96 on room air. End date and output +455 cc. Procalcitonin elevated 0.11. Fasting glucose 115 protein 5.9. LDL 69 albumin 3.4 liver functions normal.  A1c 5.1 TSH 2.230. WBC normal again at 7.7 hemoglobin 12.5. ESR 32. ASO titer elevated at 214. Ferritin 566, iron iron saturation TIBC all low. ID consult from yesterday appreciated. No cultures were drawn, blood nor wound prior to initiation of Unasyn 3 g every 6 hours; those culture should have been obtained in the ED prior to his admission. Continue Unasyn 3 g IV every 6 hours. Consultation for hand surgery from yesterday appreciated. Patient is right-hand dominant, works in waste water treatment. Impression : dog bite left hand with underlying fracture of metacarpal head and probable septic arthritis of third metacarpal phalangeal joint. Plan excisional debridement.  note from today appreciated. If needed patient is agreeable to infusion center for IV antibiotics or home health care. 12/8/2022-patient sitting in bedside chair, arm elevated on foam padding. Pain well controlled. No chest pain no dyspnea. States he is having some loose bowels but \"I always get loose bowels while on antibiotics\". Bowel movements 2-3 times a day. No urinary problems appetite good. Highest temperature last 24 hours 100.1. Currently 98.8. SPO2 98 on room air. Intake and output +1735 cc. Fasting glucose 104.   Calcium 8.5 ionized calcium from yesterday normal at 1.29. Protein 5.6 albumin 3.2. LDL 69. Liver functions normal.  Hemoglobin 11.7 WBC 7.4. ESR 38. Anaerobic cultures pending. Surgical culture growth not present incubation continues. Gram stain on unspun fluid and rare polymorphonuclear leukocytes, epithelial cells not seen, no organisms seen. Official operative note from yesterday as follows--     DATE OF PROCEDURE:  12/07/2022     PREOPERATIVE DIAGNOSES:  1. Left hand septic arthritis, metatarsophalangeal joint. 2.  Left third metacarpal fracture. POSTOPERATIVE DIAGNOSES:  1. Left middle finger metatarsophalangeal joint septic arthritis. 2.  Left middle finger fracture with tooth injury to the metacarpal  head. 3.  Partial sagittal band injury, middle finger. SURGERY PERFORMED:  1. Excisional debridement, left middle finger wound including skin,  subcutaneous tissues, deep fascia, capsule, and bone. 2.  Left hand fluoroscopic examination under anesthesia. ANESTHESIA:  1. Monitored anesthesia care. 2.  Local anesthetic by surgeon consisting of approximately 10 mL of 1%  lidocaine plain. SURGEON:  Duarte Gregorio MD.     ASSISTANT:  Reema Chen, physician assistant certified. She was  present throughout the procedure. Her assistance was used for  preoperative positioning, intraoperative retraction, closure, and  dressing application. Her assistance expedited the case and decreased  the surgical time. An orthopedic surgery resident was unavailable for  case coverage at the time of surgery. TOTAL TOURNIQUET TIME:  13 minutes at 250 mmHg brachial tourniquet. ESTIMATED BLOOD LOSS:  Minimal.     FINDINGS:  1.  Gross purulence involving the metacarpophalangeal joint. There is  direct connection of the skin laceration into the joint involving the  joint capsule as well as a large tooth indentation in the metacarpal  head.   2.  Status post excisional debridement, copious irrigation of the  remaining tissues appeared to be healthy and viable. SPECIMENS:  1. Deep tissue was sent for culture. 2.  Culture swabs were obtained and sent for culture. DISPOSITION:  The patient remained stable throughout the procedure. OPERATIVE INDICATIONS:  The patient is a very pleasant 61-year-old  gentleman who sustained a dog bite injury to his left hand. He delayed  treatment and developed worsening pain and swelling. He ultimately  presented to the Emergency Department and was admitted and started on IV  antibiotics without any significant improvement in his symptomatology. Risks, benefits, alternatives, and complications of surgical  intervention were explained to him including, but not limited to the  risk of continued or worsening infection, damage to nerves, vessels, or  tendons, wound healing complications, stiffness, possible need for  additional surgery, therapy, as well as unforeseen complications. He  voiced understanding and wished to proceed. DESCRIPTION OF PROCEDURE:  The patient was identified in the holding  area. The left hand was identified as the surgical site. He was then  seen by Anesthesia, taken to the operating room, placed supine on the  table, and underwent monitored anesthesia care per Anesthesia  Department. All bony prominences were well padded. A well-padded arm  tourniquet was placed. Under sterile conditions, local anesthetic was  then infiltrated over the surgical site. Arm was then prepared and  draped in the standard sterile fashion. Arm was exsanguinated. Tourniquet was inflated to 250 mmHg. Total tourniquet time was  approximately 13 minutes. The laceration over the dorsal ulnar aspect of the hand was then  inspected. A longitudinal incision incorporating this wound, which was  ellipsed out in a longitudinal fashion, extended proximally over the  third metacarpal of the hand was then created. The skin and  subcutaneous tissues were excised. The wound was explored and extended  directly down into the metatarsophalangeal joint. There was disruption  of the distal portion of the sagittal band. The capsule was completely  disrupted and the wound extended directly down into the metacarpal head,  where there was a large indentation consistent with a tooth injury. The  deep tissues including tendon, paratenon, capsule, and loose bony  fracture fragments were excised with a 15-blade scalpel followed by  gentle debridement with a rongeur. This tissue was sent for culture as  well. The wound was then thoroughly and copiously irrigated out with  several liters of saline. Fluoroscopy was then brought in.  AP, lateral, and gentle stretch  maneuvers revealed the metacarpophalangeal to be stable. No _____  fractures were assessed. At this point, the tourniquet was deflated. The skin was loosely approximated with nylon sutures followed by bulky  sterile dressing and splint. The patient remained stable and was taken  to the recovery room. Rosibel Graves MD        Surgical note from today appreciated. Dressing in place dry clean intact. Nonweightbearing p.o. pain control antibiotics per ID. Follow-up in office in 7 to 10 days. Home once antibiotics are arranged with ID. ID note from yesterday, changed from Unasyn to ertapenem IV. Plan for 4 weeks; PICC line needs to be inserted. He will attend infusion center. 12/9/2022-patient seen walking around the room, pain in left hand much less. No chest pain no dyspnea appetite good loose bowels have improved. Stool for C. difficile was rejected. He is hoping for discharge today; simply awaiting PICC line insertion. He would like to return to light duty at work as of 12/12/2022. He needs note written for same. Highest temperature last 24 hours 99.2. Currently 98.7. SPO2 94 on room air. Intake and output +2335 cc.   Fasting glucose 107 protein 5.7 albumin 3.2 liver functions normal. Hemoglobin 12.1 WBC 6.0. ESR 35. Anaerobes not isolated 48 hours, incubation continues. No AFB. Hospital orders:     PLAN:  Medications discussed with patient  GI prophylaxis  DVT prophylaxis  ID will be consulted  Hand surgery will be consulted in view of the fracture/avulsion  Unasyn 3 g IV every 6 hours  Hydrocodone 5 mg, 1 tablet every 4 as needed for severe pain  Ibuprofen every 8 hours  Monitor labs  ASO titer  I can find no record of blood cultures being done      RE  CHANGES AND FINDINGS   Medications reviewed with patient  GI prophylaxis  DVT prophylaxis  Consultants notes reviewed  OR today for excision and/or debridement left hand  Unasyn 3 g IV every 6 hours  Anticoagulation on hold per orthopedics  Patient received diphtheria tetanus toxoid in the ED  Percocet 5/10 mg every 4 hours as needed for pain  Await results of deep cultures  12/8/2022  Await results of final cultures aerobic and anaerobic  Unasyn 3 g IV every 6 hours  Percocet 5/10 every 4 hours as needed for pain  Elevate arm  Monitor labs  Stool for C. difficile although seems unlikely  Ambulate rather than anticoagulate according to orthopedics      Instructions at discharge:    12/9/2022  Med reconciliation completed  Prescriptions written  May return to light duty 12/12/2022  Follow-up PCP 1 week  Unasyn has been discontinued  Ertapenem 1 g IV every 24 hours, at the infusion center  Percocet 5 mg, every 6 hours as needed for pain; prescription for #28 by orthopedics  Follow-up with ID per their instructions  Follow-up with orthopedics per their instruction    Condition at DISCHARGE : Joelle [id number]     Discharged to: Home with infusion center visits    Discharge Instructions: Medications reviewed with patient    Consults:ID and orthopedic surgery     Past Medical Hx :   Past Medical History:   Diagnosis Date    Cellulitis 12/05/2022    Dog bite of left hand 12/03/2022    Fracture of base of third metacarpal bone 12/03/2022 Dog bite    Septic arthritis of hand, left ([address] 12/7/2022 12/6/2020.        Past Surgical Hx :  Past Surgical History:   Procedure Laterality Date    BICEPS TENDON REPAIR      HAND SURGERY Left 12/7/2022    LEFT HAND  INCISION AND DEBRIDEMENT performed by Adelso Palacios MD at [address] [place] [address]  12/9/2022       Labs:   CBC:   Lab Results   Component Value Date/Time    WBC 6.0 12/09/2022 04:59 AM    RBC 4.02 12/09/2022 04:59 AM    HGB 12.1 12/09/2022 04:59 AM    HCT 34.4 12/09/2022 04:59 AM    MCV 85.6 12/09/2022 04:59 AM    MCH 30.1 12/09/2022 04:59 AM    MCHC 35.2 12/09/2022 04:59 AM    RDW 11.4 12/09/2022 04:59 AM     12/09/2022 04:59 AM    MPV 8.8 12/09/2022 04:59 AM     CBC with Differential:    Lab Results   Component Value Date/Time    WBC 6.0 12/09/2022 04:59 AM    RBC 4.02 12/09/2022 04:59 AM    HGB 12.1 12/09/2022 04:59 AM    HCT 34.4 12/09/2022 04:59 AM     12/09/2022 04:59 AM    MCV 85.6 12/09/2022 04:59 AM    MCH 30.1 12/09/2022 04:59 AM    MCHC 35.2 12/09/2022 04:59 AM    RDW 11.4 12/09/2022 04:59 AM    LYMPHOPCT 33.9 12/09/2022 04:59 AM    MONOPCT 10.8 12/09/2022 04:59 AM    BASOPCT 0.5 12/09/2022 04:59 AM    MONOSABS 0.65 12/09/2022 04:59 AM    LYMPHSABS 2.05 12/09/2022 04:59 AM    EOSABS 0.16 12/09/2022 04:59 AM    BASOSABS 0.03 12/09/2022 04:59 AM     Hemoglobin/Hematocrit:    Lab Results   Component Value Date/Time    HGB 12.1 12/09/2022 04:59 AM    HCT 34.4 12/09/2022 04:59 AM     CMP:    Lab Results   Component Value Date/Time     12/09/2022 05:30 AM    K 3.9 12/09/2022 05:30 AM    K 3.6 12/06/2022 02:30 AM     12/09/2022 05:30 AM    CO2 28 12/09/2022 05:30 AM    BUN 8 12/09/2022 05:30 AM    CREATININE 1.0 12/09/2022 05:30 AM    LABGLOM >60 12/09/2022 05:30 AM    GLUCOSE 107 12/09/2022 05:30 AM    PROT 5.7 12/09/2022 05:30 AM    LABALBU 3.2 12/09/2022 05:30 AM    CALCIUM 8.7 12/09/2022 05:30 AM    BILITOT 0.2 12/09/2022 05:30 AM    ALKPHOS 43 12/09/2022 05:30 AM    AST 11 12/09/2022 05:30 AM    ALT 10 12/09/2022 05:30 AM     BMP:    Lab Results   Component Value Date/Time     12/09/2022 05:30 AM    K 3.9 12/09/2022 05:30 AM    K 3.6 12/06/2022 02:30 AM     12/09/2022 05:30 AM    CO2 28 12/09/2022 05:30 AM    BUN 8 12/09/2022 05:30 AM    LABALBU 3.2 12/09/2022 05:30 AM    CREATININE 1.0 12/09/2022 05:30 AM    CALCIUM 8.7 12/09/2022 05:30 AM    LABGLOM >60 12/09/2022 05:30 AM    GLUCOSE 107 12/09/2022 05:30 AM     Hepatic Function Panel:    Lab Results   Component Value Date/Time    ALKPHOS 43 12/09/2022 05:30 AM    ALT 10 12/09/2022 05:30 AM    AST 11 12/09/2022 05:30 AM    PROT 5.7 12/09/2022 05:30 AM    BILITOT 0.2 12/09/2022 05:30 AM    BILIDIR <0.2 12/09/2022 05:30 AM    IBILI see below 12/09/2022 05:30 AM    LABALBU 3.2 12/09/2022 05:30 AM     Ionized Calcium:  No results found for: IONCA  Magnesium:    Lab Results   Component Value Date/Time    MG 2.1 12/09/2022 05:30 AM     Phosphorus:    Lab Results   Component Value Date/Time    PHOS 4.2 12/09/2022 05:30 AM     LDH:  No results found for: LDH  Uric Acid:    Lab Results   Component Value Date/Time    LABURIC 4.3 12/07/2022 02:30 AM     PT/INR:  No results found for: PROTIME, INR  Warfarin PT/INR:  No components found for: PTPATWAR, PTINRWAR  PTT:  No results found for: APTT, PTT[APTT}  Troponin:  No results found for: TROPONINI  Last 3 Troponin:  No results found for: TROPONINI  U/A:  No results found for: NITRITE, COLORU, PROTEINU, PHUR, LABCAST, WBCUA, RBCUA, MUCUS, TRICHOMONAS, YEAST, BACTERIA, CLARITYU, SPECGRAV, LEUKOCYTESUR, UROBILINOGEN, BILIRUBINUR, BLOODU, GLUCOSEU, AMORPHOUS  HgBA1c:    Lab Results   Component Value Date/Time    LABA1C 5.1 12/07/2022 02:30 AM     FLP:    Lab Results   Component Value Date/Time    TRIG 72 12/07/2022 02:30 AM    HDL 53 12/07/2022 02:30 AM    LDLCALC 69 12/07/2022 02:30 AM    LABVLDL 14 12/07/2022 02:30 AM     TSH:    Lab Results   Component Value Date/Time    TSH 2.230 12/07/2022 02:30 AM     VITAMIN B12: No components found for: B12  FOLATE:    Lab Results   Component Value Date/Time    FOLATE 5.2 12/07/2022 02:30 AM     IRON:    Lab Results   Component Value Date/Time    IRON 23 12/07/2022 02:30 AM     Iron Saturation:  No components found for: PERCENTFE  TIBC:    Lab Results   Component Value Date/Time    TIBC 150 12/07/2022 02:30 AM     FERRITIN:    Lab Results   Component Value Date/Time    FERRITIN 566 12/07/2022 02:30 AM          CBC:  Recent Labs     12/09/22 0459   WBC 6.0   RBC 4.02   HGB 12.1*   HCT 34.4*      MCV 85.6   MCH 30.1   MCHC 35.2*   RDW 11.4*   LYMPHOPCT 33.9   MONOPCT 10.8   BASOPCT 0.5   MONOSABS 0.65   LYMPHSABS 2.05   EOSABS 0.16   BASOSABS 0.03          H & H :  Recent Labs     12/09/22 0459   HGB 12.1*       TSH:No results for input(s): TSH in the last 72 hours. GLUCOSE:No results for input(s): POCGLU in the last 72 hours. CMP:  Recent Labs     12/09/22 0530      K 3.9      CO2 28   BUN 8   CREATININE 1.0   LABGLOM >60   GLUCOSE 107*   PROT 5.7*   LABALBU 3.2*   CALCIUM 8.7   BILITOT 0.2   ALKPHOS 43   AST 11   ALT 10         BNP:No results found for: BNP    PROTIME/INR:No results for input(s): PROTIME, INR in the last 72 hours. CRP:   Recent Labs     12/09/22 0459   CRP 4.9*       ESR:  Recent Labs     12/09/22 0459   SEDRATE 45*       LIPASE , AMYLASE:  No results found for: LIPASE   No results found for: AMYLASE    ABGs: No results found for: PHART, PO2ART, DHF9RMY    CARDIAC: No results for input(s): CKTOTAL, CKMB, CKMBINDEX, TROPONINI in the last 72 hours. Lipid Profile:   Lab Results   Component Value Date/Time    TRIG 72 12/07/2022 02:30 AM    HDL 53 12/07/2022 02:30 AM    LDLCALC 69 12/07/2022 02:30 AM    CHOL 136 12/07/2022 02:30 AM        XR HAND LEFT (MIN 3 VIEWS)    Result Date: 12/5/2022  EXAMINATION: THREE XRAY VIEWS OF THE LEFT HAND 12/5/2022 12:23 pm COMPARISON: None. Med             Time Spent on discharge is more than 30 minutes --      TIME  INCLUDES TIME THAT WAS  SPENT WITH DISCHARGE PAPERS, MEDICATION REVIEW, MEDICATION RECONCILIATION,   PRESCRIPTIONS, CHART REVIEW, PATIENT EXAM , FINAL PROGRESS NOTE, DISCUSSION OF FINDINGS WITH PATIENT AND AVAILABLE FAMILY , AND DICTATION  WHERE NEEDED ; Home Health Care [facility]) FORMS COMPLETED ; N-17  COMPLETION ;  H&P UPDATED ; DURABLE EQUIPMENT FORMS. Active Hospital Problems    Diagnosis     Septic arthritis of hand, left ([address] [M00.9]      Priority: Medium    Closed displaced fracture of third metacarpal bone of left hand [S62.303A]      Priority: Medium    Cellulitis [L03.90]      Priority: Medium    Dog bite, hand, left, initial encounter [J02.970E, W54. 0XXA]      Priority: Medium    Fracture of base of third metacarpal bone [S62.318A]      Priority: Medium       Signed:    Cristian Andersen DO      12/11/2022    3:16 PM    Voice recognition software use for dictation

## 2022-12-12 ENCOUNTER — HOSPITAL ENCOUNTER (OUTPATIENT)
Dept: INFUSION THERAPY | Age: 52
Setting detail: INFUSION SERIES
Discharge: HOME | End: 2022-12-12
Payer: COMMERCIAL

## 2022-12-12 VITALS
HEART RATE: 76 BPM | HEIGHT: 67 IN | BODY MASS INDEX: 29.82 KG/M2 | OXYGEN SATURATION: 97 % | DIASTOLIC BLOOD PRESSURE: 74 MMHG | TEMPERATURE: 96.6 F | SYSTOLIC BLOOD PRESSURE: 143 MMHG | WEIGHT: 190 LBS | RESPIRATION RATE: 16 BRPM

## 2022-12-12 DIAGNOSIS — M00.9 PYOGENIC ARTHRITIS OF LEFT HAND, DUE TO UNSPECIFIED ORGANISM (HCC): Primary | ICD-10-CM

## 2022-12-12 LAB
ALBUMIN SERPL-MCNC: 3.9 G/DL (ref 3.5–5.2)
ALP BLD-CCNC: 57 U/L (ref 40–129)
ALT SERPL-CCNC: 19 U/L (ref 0–40)
ANAEROBIC CULTURE: NORMAL
ANION GAP SERPL CALCULATED.3IONS-SCNC: 8 MMOL/L (ref 7–16)
AST SERPL-CCNC: 20 U/L (ref 0–39)
BASOPHILS ABSOLUTE: 0.03 E9/L (ref 0–0.2)
BASOPHILS RELATIVE PERCENT: 0.4 % (ref 0–2)
BILIRUB SERPL-MCNC: 0.2 MG/DL (ref 0–1.2)
BUN BLDV-MCNC: 16 MG/DL (ref 6–20)
C-REACTIVE PROTEIN: 1.1 MG/DL (ref 0–0.4)
CALCIUM SERPL-MCNC: 9.4 MG/DL (ref 8.6–10.2)
CHLORIDE BLD-SCNC: 103 MMOL/L (ref 98–107)
CO2: 29 MMOL/L (ref 22–29)
CREAT SERPL-MCNC: 1 MG/DL (ref 0.7–1.2)
EOSINOPHILS ABSOLUTE: 0.18 E9/L (ref 0.05–0.5)
EOSINOPHILS RELATIVE PERCENT: 2.7 % (ref 0–6)
GFR SERPL CREATININE-BSD FRML MDRD: >60 ML/MIN/1.73
GLUCOSE BLD-MCNC: 93 MG/DL (ref 74–99)
HCT VFR BLD CALC: 41.9 % (ref 37–54)
HEMOGLOBIN: 14.4 G/DL (ref 12.5–16.5)
IMMATURE GRANULOCYTES #: 0.04 E9/L
IMMATURE GRANULOCYTES %: 0.6 % (ref 0–5)
LYMPHOCYTES ABSOLUTE: 2.43 E9/L (ref 1.5–4)
LYMPHOCYTES RELATIVE PERCENT: 36.4 % (ref 20–42)
MCH RBC QN AUTO: 30.3 PG (ref 26–35)
MCHC RBC AUTO-ENTMCNC: 34.4 % (ref 32–34.5)
MCV RBC AUTO: 88.2 FL (ref 80–99.9)
MONOCYTES ABSOLUTE: 0.62 E9/L (ref 0.1–0.95)
MONOCYTES RELATIVE PERCENT: 9.3 % (ref 2–12)
NEUTROPHILS ABSOLUTE: 3.38 E9/L (ref 1.8–7.3)
NEUTROPHILS RELATIVE PERCENT: 50.6 % (ref 43–80)
PDW BLD-RTO: 11.5 FL (ref 11.5–15)
PLATELET # BLD: 458 E9/L (ref 130–450)
PMV BLD AUTO: 8.9 FL (ref 7–12)
POTASSIUM SERPL-SCNC: 3.9 MMOL/L (ref 3.5–5)
RBC # BLD: 4.75 E12/L (ref 3.8–5.8)
SEDIMENTATION RATE, ERYTHROCYTE: 25 MM/HR (ref 0–15)
SODIUM BLD-SCNC: 140 MMOL/L (ref 132–146)
TOTAL PROTEIN: 7.1 G/DL (ref 6.4–8.3)
WBC # BLD: 6.7 E9/L (ref 4.5–11.5)

## 2022-12-12 PROCEDURE — 85651 RBC SED RATE NONAUTOMATED: CPT

## 2022-12-12 PROCEDURE — 80053 COMPREHEN METABOLIC PANEL: CPT

## 2022-12-12 PROCEDURE — 96374 THER/PROPH/DIAG INJ IV PUSH: CPT

## 2022-12-12 PROCEDURE — 2580000003 HC RX 258

## 2022-12-12 PROCEDURE — 85025 COMPLETE CBC W/AUTO DIFF WBC: CPT

## 2022-12-12 PROCEDURE — 36415 COLL VENOUS BLD VENIPUNCTURE: CPT

## 2022-12-12 PROCEDURE — 6360000002 HC RX W HCPCS

## 2022-12-12 PROCEDURE — 86140 C-REACTIVE PROTEIN: CPT

## 2022-12-12 PROCEDURE — A4216 STERILE WATER/SALINE, 10 ML: HCPCS

## 2022-12-12 RX ORDER — DIPHENHYDRAMINE HCL 25 MG
50 TABLET ORAL
Status: CANCELLED
Start: 2022-12-13

## 2022-12-12 RX ORDER — HEPARIN SODIUM (PORCINE) LOCK FLUSH IV SOLN 100 UNIT/ML 100 UNIT/ML
300 SOLUTION INTRAVENOUS PRN
Status: CANCELLED | OUTPATIENT
Start: 2022-12-13

## 2022-12-12 RX ORDER — SODIUM CHLORIDE 0.9 % (FLUSH) 0.9 %
5-40 SYRINGE (ML) INJECTION PRN
Status: DISCONTINUED | OUTPATIENT
Start: 2022-12-12 | End: 2022-12-13 | Stop reason: HOSPADM

## 2022-12-12 RX ORDER — DIPHENHYDRAMINE HYDROCHLORIDE 50 MG/ML
50 INJECTION INTRAMUSCULAR; INTRAVENOUS
Status: CANCELLED | OUTPATIENT
Start: 2022-12-13

## 2022-12-12 RX ORDER — HEPARIN SODIUM (PORCINE) LOCK FLUSH IV SOLN 100 UNIT/ML 100 UNIT/ML
300 SOLUTION INTRAVENOUS PRN
Status: DISCONTINUED | OUTPATIENT
Start: 2022-12-12 | End: 2022-12-13 | Stop reason: HOSPADM

## 2022-12-12 RX ORDER — SODIUM CHLORIDE 0.9 % (FLUSH) 0.9 %
5-40 SYRINGE (ML) INJECTION PRN
Status: CANCELLED | OUTPATIENT
Start: 2022-12-13

## 2022-12-12 RX ORDER — EPINEPHRINE 1 MG/ML
0.3 INJECTION, SOLUTION, CONCENTRATE INTRAVENOUS PRN
Status: CANCELLED | OUTPATIENT
Start: 2022-12-13

## 2022-12-12 RX ADMIN — Medication 300 UNITS: at 07:16

## 2022-12-12 RX ADMIN — SODIUM CHLORIDE, PRESERVATIVE FREE 10 ML: 5 INJECTION INTRAVENOUS at 07:05

## 2022-12-12 RX ADMIN — SODIUM CHLORIDE, PRESERVATIVE FREE 10 ML: 5 INJECTION INTRAVENOUS at 07:03

## 2022-12-12 RX ADMIN — ERTAPENEM SODIUM 1000 MG: 1 INJECTION, POWDER, LYOPHILIZED, FOR SOLUTION INTRAMUSCULAR; INTRAVENOUS at 07:10

## 2022-12-12 RX ADMIN — SODIUM CHLORIDE, PRESERVATIVE FREE 10 ML: 5 INJECTION INTRAVENOUS at 07:15

## 2022-12-13 ENCOUNTER — HOSPITAL ENCOUNTER (OUTPATIENT)
Dept: INFUSION THERAPY | Age: 52
Setting detail: INFUSION SERIES
Discharge: HOME | End: 2022-12-13
Payer: COMMERCIAL

## 2022-12-13 VITALS
HEIGHT: 67 IN | DIASTOLIC BLOOD PRESSURE: 63 MMHG | WEIGHT: 190 LBS | HEART RATE: 76 BPM | RESPIRATION RATE: 16 BRPM | OXYGEN SATURATION: 98 % | SYSTOLIC BLOOD PRESSURE: 110 MMHG | TEMPERATURE: 97.2 F | BODY MASS INDEX: 29.82 KG/M2

## 2022-12-13 DIAGNOSIS — M00.9 PYOGENIC ARTHRITIS OF LEFT HAND, DUE TO UNSPECIFIED ORGANISM (HCC): Primary | ICD-10-CM

## 2022-12-13 PROCEDURE — 96374 THER/PROPH/DIAG INJ IV PUSH: CPT

## 2022-12-13 PROCEDURE — 2580000003 HC RX 258

## 2022-12-13 PROCEDURE — A4216 STERILE WATER/SALINE, 10 ML: HCPCS

## 2022-12-13 PROCEDURE — 6360000002 HC RX W HCPCS

## 2022-12-13 RX ORDER — SODIUM CHLORIDE 0.9 % (FLUSH) 0.9 %
5-40 SYRINGE (ML) INJECTION PRN
Status: DISCONTINUED | OUTPATIENT
Start: 2022-12-13 | End: 2022-12-14 | Stop reason: HOSPADM

## 2022-12-13 RX ORDER — DIPHENHYDRAMINE HCL 25 MG
50 TABLET ORAL
Status: CANCELLED
Start: 2022-12-14

## 2022-12-13 RX ORDER — DIPHENHYDRAMINE HYDROCHLORIDE 50 MG/ML
50 INJECTION INTRAMUSCULAR; INTRAVENOUS
Status: CANCELLED | OUTPATIENT
Start: 2022-12-14

## 2022-12-13 RX ORDER — EPINEPHRINE 1 MG/ML
0.3 INJECTION, SOLUTION, CONCENTRATE INTRAVENOUS PRN
Status: CANCELLED | OUTPATIENT
Start: 2022-12-14

## 2022-12-13 RX ORDER — HEPARIN SODIUM (PORCINE) LOCK FLUSH IV SOLN 100 UNIT/ML 100 UNIT/ML
300 SOLUTION INTRAVENOUS PRN
Status: DISCONTINUED | OUTPATIENT
Start: 2022-12-13 | End: 2022-12-14 | Stop reason: HOSPADM

## 2022-12-13 RX ORDER — SODIUM CHLORIDE 0.9 % (FLUSH) 0.9 %
5-40 SYRINGE (ML) INJECTION PRN
Status: CANCELLED | OUTPATIENT
Start: 2022-12-14

## 2022-12-13 RX ORDER — HEPARIN SODIUM (PORCINE) LOCK FLUSH IV SOLN 100 UNIT/ML 100 UNIT/ML
300 SOLUTION INTRAVENOUS PRN
Status: CANCELLED | OUTPATIENT
Start: 2022-12-14

## 2022-12-13 RX ADMIN — SODIUM CHLORIDE, PRESERVATIVE FREE 10 ML: 5 INJECTION INTRAVENOUS at 07:19

## 2022-12-13 RX ADMIN — Medication 300 UNITS: at 07:20

## 2022-12-13 RX ADMIN — ERTAPENEM SODIUM 1000 MG: 1 INJECTION, POWDER, LYOPHILIZED, FOR SOLUTION INTRAMUSCULAR; INTRAVENOUS at 07:14

## 2022-12-13 RX ADMIN — SODIUM CHLORIDE, PRESERVATIVE FREE 10 ML: 5 INJECTION INTRAVENOUS at 07:10

## 2022-12-14 ENCOUNTER — HOSPITAL ENCOUNTER (OUTPATIENT)
Dept: INFUSION THERAPY | Age: 52
Setting detail: INFUSION SERIES
Discharge: HOME | End: 2022-12-14
Payer: COMMERCIAL

## 2022-12-14 VITALS
OXYGEN SATURATION: 98 % | HEART RATE: 61 BPM | RESPIRATION RATE: 16 BRPM | BODY MASS INDEX: 29.82 KG/M2 | HEIGHT: 67 IN | TEMPERATURE: 97.5 F | WEIGHT: 190 LBS | SYSTOLIC BLOOD PRESSURE: 116 MMHG | DIASTOLIC BLOOD PRESSURE: 70 MMHG

## 2022-12-14 DIAGNOSIS — M00.9 PYOGENIC ARTHRITIS OF LEFT HAND, DUE TO UNSPECIFIED ORGANISM (HCC): Primary | ICD-10-CM

## 2022-12-14 PROCEDURE — A4216 STERILE WATER/SALINE, 10 ML: HCPCS

## 2022-12-14 PROCEDURE — 6360000002 HC RX W HCPCS

## 2022-12-14 PROCEDURE — 2580000003 HC RX 258

## 2022-12-14 PROCEDURE — 96374 THER/PROPH/DIAG INJ IV PUSH: CPT

## 2022-12-14 RX ORDER — SODIUM CHLORIDE 0.9 % (FLUSH) 0.9 %
5-40 SYRINGE (ML) INJECTION PRN
Status: CANCELLED | OUTPATIENT
Start: 2022-12-15

## 2022-12-14 RX ORDER — HEPARIN SODIUM (PORCINE) LOCK FLUSH IV SOLN 100 UNIT/ML 100 UNIT/ML
300 SOLUTION INTRAVENOUS PRN
Status: CANCELLED | OUTPATIENT
Start: 2022-12-15

## 2022-12-14 RX ORDER — EPINEPHRINE 1 MG/ML
0.3 INJECTION, SOLUTION, CONCENTRATE INTRAVENOUS PRN
Status: CANCELLED | OUTPATIENT
Start: 2022-12-15

## 2022-12-14 RX ORDER — DIPHENHYDRAMINE HYDROCHLORIDE 50 MG/ML
50 INJECTION INTRAMUSCULAR; INTRAVENOUS
Status: CANCELLED | OUTPATIENT
Start: 2022-12-15

## 2022-12-14 RX ORDER — SODIUM CHLORIDE 0.9 % (FLUSH) 0.9 %
5-40 SYRINGE (ML) INJECTION PRN
Status: DISCONTINUED | OUTPATIENT
Start: 2022-12-14 | End: 2022-12-15 | Stop reason: HOSPADM

## 2022-12-14 RX ORDER — DIPHENHYDRAMINE HCL 25 MG
50 TABLET ORAL
Status: CANCELLED
Start: 2022-12-15

## 2022-12-14 RX ORDER — HEPARIN SODIUM (PORCINE) LOCK FLUSH IV SOLN 100 UNIT/ML 100 UNIT/ML
300 SOLUTION INTRAVENOUS PRN
Status: DISCONTINUED | OUTPATIENT
Start: 2022-12-14 | End: 2022-12-15 | Stop reason: HOSPADM

## 2022-12-14 RX ADMIN — SODIUM CHLORIDE, PRESERVATIVE FREE 10 ML: 5 INJECTION INTRAVENOUS at 07:20

## 2022-12-14 RX ADMIN — ERTAPENEM SODIUM 1000 MG: 1 INJECTION, POWDER, LYOPHILIZED, FOR SOLUTION INTRAMUSCULAR; INTRAVENOUS at 07:21

## 2022-12-14 RX ADMIN — SODIUM CHLORIDE, PRESERVATIVE FREE 10 ML: 5 INJECTION INTRAVENOUS at 07:26

## 2022-12-14 RX ADMIN — Medication 300 UNITS: at 07:27

## 2022-12-15 ENCOUNTER — OFFICE VISIT (OUTPATIENT)
Dept: ORTHOPEDIC SURGERY | Age: 52
End: 2022-12-15

## 2022-12-15 ENCOUNTER — HOSPITAL ENCOUNTER (OUTPATIENT)
Dept: INFUSION THERAPY | Age: 52
Setting detail: INFUSION SERIES
Discharge: HOME | End: 2022-12-15
Payer: COMMERCIAL

## 2022-12-15 VITALS — BODY MASS INDEX: 29.82 KG/M2 | HEIGHT: 67 IN | WEIGHT: 190 LBS

## 2022-12-15 VITALS
HEART RATE: 68 BPM | SYSTOLIC BLOOD PRESSURE: 120 MMHG | DIASTOLIC BLOOD PRESSURE: 66 MMHG | RESPIRATION RATE: 16 BRPM | TEMPERATURE: 97.4 F

## 2022-12-15 DIAGNOSIS — M00.9 PYOGENIC ARTHRITIS OF LEFT HAND, DUE TO UNSPECIFIED ORGANISM (HCC): Primary | ICD-10-CM

## 2022-12-15 DIAGNOSIS — S61.452A DOG BITE, HAND, LEFT, INITIAL ENCOUNTER: Primary | ICD-10-CM
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
## 2022-12-15 LAB
ALBUMIN SERPL-MCNC: 4.1 G/DL (ref 3.5–5.2)
ALP BLD-CCNC: 58 U/L (ref 40–129)
ALT SERPL-CCNC: 43 U/L (ref 0–40)
ANION GAP SERPL CALCULATED.3IONS-SCNC: 11 MMOL/L (ref 7–16)
AST SERPL-CCNC: 29 U/L (ref 0–39)
BASOPHILS ABSOLUTE: 0.04 E9/L (ref 0–0.2)
BASOPHILS RELATIVE PERCENT: 0.7 % (ref 0–2)
BILIRUB SERPL-MCNC: <0.2 MG/DL (ref 0–1.2)
BUN BLDV-MCNC: 16 MG/DL (ref 6–20)
CALCIUM SERPL-MCNC: 9.5 MG/DL (ref 8.6–10.2)
CHLORIDE BLD-SCNC: 103 MMOL/L (ref 98–107)
CO2: 26 MMOL/L (ref 22–29)
CREAT SERPL-MCNC: 1 MG/DL (ref 0.7–1.2)
EOSINOPHILS ABSOLUTE: 0.09 E9/L (ref 0.05–0.5)
EOSINOPHILS RELATIVE PERCENT: 1.5 % (ref 0–6)
GFR SERPL CREATININE-BSD FRML MDRD: >60 ML/MIN/1.73
GLUCOSE BLD-MCNC: 91 MG/DL (ref 74–99)
HCT VFR BLD CALC: 42.1 % (ref 37–54)
HEMOGLOBIN: 14.3 G/DL (ref 12.5–16.5)
IMMATURE GRANULOCYTES #: 0.03 E9/L
IMMATURE GRANULOCYTES %: 0.5 % (ref 0–5)
LYMPHOCYTES ABSOLUTE: 2.59 E9/L (ref 1.5–4)
LYMPHOCYTES RELATIVE PERCENT: 42.1 % (ref 20–42)
MCH RBC QN AUTO: 29.9 PG (ref 26–35)
MCHC RBC AUTO-ENTMCNC: 34 % (ref 32–34.5)
MCV RBC AUTO: 88.1 FL (ref 80–99.9)
MONOCYTES ABSOLUTE: 0.53 E9/L (ref 0.1–0.95)
MONOCYTES RELATIVE PERCENT: 8.6 % (ref 2–12)
NEUTROPHILS ABSOLUTE: 2.87 E9/L (ref 1.8–7.3)
NEUTROPHILS RELATIVE PERCENT: 46.6 % (ref 43–80)
PDW BLD-RTO: 11.4 FL (ref 11.5–15)
PLATELET # BLD: 502 E9/L (ref 130–450)
PMV BLD AUTO: 8.8 FL (ref 7–12)
POTASSIUM SERPL-SCNC: 4 MMOL/L (ref 3.5–5)
RBC # BLD: 4.78 E12/L (ref 3.8–5.8)
SODIUM BLD-SCNC: 140 MMOL/L (ref 132–146)
TOTAL PROTEIN: 6.7 G/DL (ref 6.4–8.3)
WBC # BLD: 6.2 E9/L (ref 4.5–11.5)

## 2022-12-15 PROCEDURE — 80053 COMPREHEN METABOLIC PANEL: CPT

## 2022-12-15 PROCEDURE — 85025 COMPLETE CBC W/AUTO DIFF WBC: CPT

## 2022-12-15 PROCEDURE — A4216 STERILE WATER/SALINE, 10 ML: HCPCS

## 2022-12-15 PROCEDURE — 2580000003 HC RX 258

## 2022-12-15 PROCEDURE — 6360000002 HC RX W HCPCS

## 2022-12-15 PROCEDURE — 96374 THER/PROPH/DIAG INJ IV PUSH: CPT

## 2022-12-15 PROCEDURE — 36415 COLL VENOUS BLD VENIPUNCTURE: CPT

## 2022-12-15 RX ORDER — DIPHENHYDRAMINE HCL 25 MG
50 TABLET ORAL
Status: CANCELLED
Start: 2022-12-16

## 2022-12-15 RX ORDER — HEPARIN SODIUM (PORCINE) LOCK FLUSH IV SOLN 100 UNIT/ML 100 UNIT/ML
300 SOLUTION INTRAVENOUS PRN
Status: CANCELLED | OUTPATIENT
Start: 2022-12-16

## 2022-12-15 RX ORDER — DIPHENHYDRAMINE HYDROCHLORIDE 50 MG/ML
50 INJECTION INTRAMUSCULAR; INTRAVENOUS
Status: CANCELLED | OUTPATIENT
Start: 2022-12-16

## 2022-12-15 RX ORDER — HEPARIN SODIUM (PORCINE) LOCK FLUSH IV SOLN 100 UNIT/ML 100 UNIT/ML
300 SOLUTION INTRAVENOUS PRN
Status: DISCONTINUED | OUTPATIENT
Start: 2022-12-15 | End: 2022-12-16 | Stop reason: HOSPADM

## 2022-12-15 RX ORDER — EPINEPHRINE 1 MG/ML
0.3 INJECTION, SOLUTION, CONCENTRATE INTRAVENOUS PRN
Status: CANCELLED | OUTPATIENT
Start: 2022-12-16

## 2022-12-15 RX ORDER — SODIUM CHLORIDE 0.9 % (FLUSH) 0.9 %
5-40 SYRINGE (ML) INJECTION PRN
Status: CANCELLED | OUTPATIENT
Start: 2022-12-16

## 2022-12-15 RX ORDER — SODIUM CHLORIDE 0.9 % (FLUSH) 0.9 %
5-40 SYRINGE (ML) INJECTION PRN
Status: DISCONTINUED | OUTPATIENT
Start: 2022-12-15 | End: 2022-12-16 | Stop reason: HOSPADM

## 2022-12-15 RX ADMIN — ERTAPENEM SODIUM 1000 MG: 1 INJECTION, POWDER, LYOPHILIZED, FOR SOLUTION INTRAMUSCULAR; INTRAVENOUS at 07:15

## 2022-12-15 RX ADMIN — Medication 300 UNITS: at 07:21

## 2022-12-15 RX ADMIN — SODIUM CHLORIDE, PRESERVATIVE FREE 10 ML: 5 INJECTION INTRAVENOUS at 07:21

## 2022-12-15 RX ADMIN — SODIUM CHLORIDE, PRESERVATIVE FREE 10 ML: 5 INJECTION INTRAVENOUS at 07:14

## 2022-12-15 NOTE — PROGRESS NOTES
8 days postop right hand I&D for dog bite injury with injury to the metacarpal head. Overall he is doing well. He is on Invanz. He is being followed by infectious disease. Physical exam: Incision healing nicely. He does have significant mount of stiffness to the to the hand. He has full digital extension. Limited composite flexion secondary to stiffness particular at the middle finger MCP joint. No residual signs of infection present. 8 days postop    Plan    Continue with IV antibiotics. Local wound care was explained. Home exercise including intrinsic extrinsic stretches were explained and demonstrated and provided. He was also referred to therapy. He will follow-up in 4 to 6 weeks after completing course of therapy.

## 2022-12-15 NOTE — PROGRESS NOTES
Tolerated   iv push  med well. Reviewed therapy plan, offered education material and/or discharge material, reviewed medication information and signs and symptoms  and educated on possible side effects, verbalizes good knowledge of current plan patient verbalizes understanding, and has no signs or symptoms to report at this time. Patient discharged. Patient alert and oriented x3. No distress noted. Vital signs stable. Patient denies any new or worsening pain. Patient denies any needs. All questions answered. Next appointment scheduled. Declines copy of AVS. Patient instructed on s/s infection/complication with midline to report and instructed on keeping midline dressing clean, dry and intact patient verbalizes understanding.

## 2022-12-16 ENCOUNTER — HOSPITAL ENCOUNTER (OUTPATIENT)
Dept: INFUSION THERAPY | Age: 52
Setting detail: INFUSION SERIES
Discharge: HOME | End: 2022-12-16
Payer: COMMERCIAL

## 2022-12-16 VITALS
SYSTOLIC BLOOD PRESSURE: 117 MMHG | DIASTOLIC BLOOD PRESSURE: 72 MMHG | OXYGEN SATURATION: 96 % | TEMPERATURE: 97.5 F | HEART RATE: 73 BPM | RESPIRATION RATE: 16 BRPM

## 2022-12-16 DIAGNOSIS — M00.9 PYOGENIC ARTHRITIS OF LEFT HAND, DUE TO UNSPECIFIED ORGANISM (HCC): Primary | ICD-10-CM

## 2022-12-16 PROCEDURE — 96374 THER/PROPH/DIAG INJ IV PUSH: CPT

## 2022-12-16 PROCEDURE — 2580000003 HC RX 258

## 2022-12-16 PROCEDURE — 6360000002 HC RX W HCPCS

## 2022-12-16 PROCEDURE — A4216 STERILE WATER/SALINE, 10 ML: HCPCS

## 2022-12-16 RX ORDER — EPINEPHRINE 1 MG/ML
0.3 INJECTION, SOLUTION, CONCENTRATE INTRAVENOUS PRN
Status: CANCELLED | OUTPATIENT
Start: 2022-12-17

## 2022-12-16 RX ORDER — HEPARIN SODIUM (PORCINE) LOCK FLUSH IV SOLN 100 UNIT/ML 100 UNIT/ML
300 SOLUTION INTRAVENOUS PRN
Status: CANCELLED | OUTPATIENT
Start: 2022-12-17

## 2022-12-16 RX ORDER — HEPARIN SODIUM (PORCINE) LOCK FLUSH IV SOLN 100 UNIT/ML 100 UNIT/ML
300 SOLUTION INTRAVENOUS PRN
Status: DISCONTINUED | OUTPATIENT
Start: 2022-12-16 | End: 2022-12-17 | Stop reason: HOSPADM

## 2022-12-16 RX ORDER — DIPHENHYDRAMINE HYDROCHLORIDE 50 MG/ML
50 INJECTION INTRAMUSCULAR; INTRAVENOUS
Status: CANCELLED | OUTPATIENT
Start: 2022-12-17

## 2022-12-16 RX ORDER — SODIUM CHLORIDE 0.9 % (FLUSH) 0.9 %
5-40 SYRINGE (ML) INJECTION PRN
Status: DISCONTINUED | OUTPATIENT
Start: 2022-12-16 | End: 2022-12-17 | Stop reason: HOSPADM

## 2022-12-16 RX ORDER — SODIUM CHLORIDE 0.9 % (FLUSH) 0.9 %
5-40 SYRINGE (ML) INJECTION PRN
Status: CANCELLED | OUTPATIENT
Start: 2022-12-17

## 2022-12-16 RX ORDER — DIPHENHYDRAMINE HCL 25 MG
50 TABLET ORAL
Status: CANCELLED
Start: 2022-12-17

## 2022-12-16 RX ADMIN — Medication 300 UNITS: at 07:22

## 2022-12-16 RX ADMIN — SODIUM CHLORIDE, PRESERVATIVE FREE 10 ML: 5 INJECTION INTRAVENOUS at 07:22

## 2022-12-16 RX ADMIN — ERTAPENEM SODIUM 1000 MG: 1 INJECTION, POWDER, LYOPHILIZED, FOR SOLUTION INTRAMUSCULAR; INTRAVENOUS at 07:13

## 2022-12-16 RX ADMIN — SODIUM CHLORIDE, PRESERVATIVE FREE 10 ML: 5 INJECTION INTRAVENOUS at 07:13

## 2022-12-16 ASSESSMENT — PAIN SCALES - GENERAL: PAINLEVEL_OUTOF10: 3

## 2022-12-16 ASSESSMENT — PAIN DESCRIPTION - PAIN TYPE: TYPE: SURGICAL PAIN

## 2022-12-16 NOTE — PROGRESS NOTES
Tolerated infusion   IV PUSH MED well. Reviewed therapy plan, offered education material and/or discharge material, reviewed medication information and signs and symptoms  and educated on possible side effects, verbalizes good knowledge of current plan patient verbalizes understanding, and has no signs or symptoms to report at this time. Patient discharged. Patient alert and oriented x3. No distress noted. Vital signs stable. Patient denies any new or worsening pain. Patient denies any needs. All questions answered. Next appointment scheduled. DECLINES copy of AVS. Patient instructed on s/s infection/complication with midline to report and instructed on keeping midline dressing clean, dry and intact patient verbalizes understanding.

## 2022-12-17 ENCOUNTER — HOSPITAL ENCOUNTER (OUTPATIENT)
Dept: INFUSION THERAPY | Age: 52
Setting detail: INFUSION SERIES
Discharge: HOME | End: 2022-12-17
Payer: COMMERCIAL

## 2022-12-17 VITALS
SYSTOLIC BLOOD PRESSURE: 116 MMHG | RESPIRATION RATE: 18 BRPM | TEMPERATURE: 97.8 F | HEART RATE: 94 BPM | DIASTOLIC BLOOD PRESSURE: 62 MMHG | OXYGEN SATURATION: 98 %

## 2022-12-17 DIAGNOSIS — M00.9 PYOGENIC ARTHRITIS OF LEFT HAND, DUE TO UNSPECIFIED ORGANISM (HCC): Primary | ICD-10-CM

## 2022-12-17 PROCEDURE — 6360000002 HC RX W HCPCS

## 2022-12-17 PROCEDURE — 96374 THER/PROPH/DIAG INJ IV PUSH: CPT

## 2022-12-17 PROCEDURE — A4216 STERILE WATER/SALINE, 10 ML: HCPCS

## 2022-12-17 PROCEDURE — 2580000003 HC RX 258

## 2022-12-17 RX ORDER — DIPHENHYDRAMINE HYDROCHLORIDE 50 MG/ML
50 INJECTION INTRAMUSCULAR; INTRAVENOUS
Status: CANCELLED | OUTPATIENT
Start: 2022-12-18

## 2022-12-17 RX ORDER — DIPHENHYDRAMINE HCL 25 MG
50 TABLET ORAL
Status: CANCELLED
Start: 2022-12-18

## 2022-12-17 RX ORDER — HEPARIN SODIUM (PORCINE) LOCK FLUSH IV SOLN 100 UNIT/ML 100 UNIT/ML
300 SOLUTION INTRAVENOUS PRN
Status: DISCONTINUED | OUTPATIENT
Start: 2022-12-17 | End: 2022-12-18 | Stop reason: HOSPADM

## 2022-12-17 RX ORDER — SODIUM CHLORIDE 0.9 % (FLUSH) 0.9 %
5-40 SYRINGE (ML) INJECTION PRN
Status: DISCONTINUED | OUTPATIENT
Start: 2022-12-17 | End: 2022-12-18 | Stop reason: HOSPADM

## 2022-12-17 RX ORDER — EPINEPHRINE 1 MG/ML
0.3 INJECTION, SOLUTION, CONCENTRATE INTRAVENOUS PRN
Status: CANCELLED | OUTPATIENT
Start: 2022-12-18

## 2022-12-17 RX ORDER — SODIUM CHLORIDE 0.9 % (FLUSH) 0.9 %
5-40 SYRINGE (ML) INJECTION PRN
Status: CANCELLED | OUTPATIENT
Start: 2022-12-18

## 2022-12-17 RX ORDER — HEPARIN SODIUM (PORCINE) LOCK FLUSH IV SOLN 100 UNIT/ML 100 UNIT/ML
300 SOLUTION INTRAVENOUS PRN
Status: CANCELLED | OUTPATIENT
Start: 2022-12-18

## 2022-12-17 RX ADMIN — Medication 300 UNITS: at 08:06

## 2022-12-17 RX ADMIN — SODIUM CHLORIDE, PRESERVATIVE FREE 10 ML: 5 INJECTION INTRAVENOUS at 07:58

## 2022-12-17 RX ADMIN — SODIUM CHLORIDE, PRESERVATIVE FREE 10 ML: 5 INJECTION INTRAVENOUS at 08:05

## 2022-12-17 RX ADMIN — ERTAPENEM SODIUM 1000 MG: 1 INJECTION, POWDER, LYOPHILIZED, FOR SOLUTION INTRAMUSCULAR; INTRAVENOUS at 07:59

## 2022-12-17 ASSESSMENT — PAIN SCALES - GENERAL: PAINLEVEL_OUTOF10: 0

## 2022-12-18 ENCOUNTER — HOSPITAL ENCOUNTER (OUTPATIENT)
Dept: INFUSION THERAPY | Age: 52
Setting detail: INFUSION SERIES
Discharge: HOME | End: 2022-12-18
Payer: COMMERCIAL

## 2022-12-18 VITALS
OXYGEN SATURATION: 98 % | BODY MASS INDEX: 29.82 KG/M2 | HEART RATE: 87 BPM | TEMPERATURE: 97 F | HEIGHT: 67 IN | SYSTOLIC BLOOD PRESSURE: 127 MMHG | WEIGHT: 190 LBS | DIASTOLIC BLOOD PRESSURE: 69 MMHG | RESPIRATION RATE: 16 BRPM

## 2022-12-18 DIAGNOSIS — M00.9 PYOGENIC ARTHRITIS OF LEFT HAND, DUE TO UNSPECIFIED ORGANISM (HCC): Primary | ICD-10-CM

## 2022-12-18 PROCEDURE — 96374 THER/PROPH/DIAG INJ IV PUSH: CPT

## 2022-12-18 PROCEDURE — 2580000003 HC RX 258

## 2022-12-18 PROCEDURE — 6360000002 HC RX W HCPCS

## 2022-12-18 PROCEDURE — A4216 STERILE WATER/SALINE, 10 ML: HCPCS

## 2022-12-18 RX ORDER — DIPHENHYDRAMINE HCL 25 MG
50 TABLET ORAL
Status: CANCELLED
Start: 2022-12-19

## 2022-12-18 RX ORDER — SODIUM CHLORIDE 0.9 % (FLUSH) 0.9 %
5-40 SYRINGE (ML) INJECTION PRN
Status: CANCELLED | OUTPATIENT
Start: 2022-12-19

## 2022-12-18 RX ORDER — HEPARIN SODIUM (PORCINE) LOCK FLUSH IV SOLN 100 UNIT/ML 100 UNIT/ML
300 SOLUTION INTRAVENOUS PRN
Status: DISCONTINUED | OUTPATIENT
Start: 2022-12-18 | End: 2022-12-19 | Stop reason: HOSPADM

## 2022-12-18 RX ORDER — EPINEPHRINE 1 MG/ML
0.3 INJECTION, SOLUTION, CONCENTRATE INTRAVENOUS PRN
Status: CANCELLED | OUTPATIENT
Start: 2022-12-19

## 2022-12-18 RX ORDER — DIPHENHYDRAMINE HYDROCHLORIDE 50 MG/ML
50 INJECTION INTRAMUSCULAR; INTRAVENOUS
Status: CANCELLED | OUTPATIENT
Start: 2022-12-19

## 2022-12-18 RX ORDER — HEPARIN SODIUM (PORCINE) LOCK FLUSH IV SOLN 100 UNIT/ML 100 UNIT/ML
300 SOLUTION INTRAVENOUS PRN
Status: CANCELLED | OUTPATIENT
Start: 2022-12-19

## 2022-12-18 RX ORDER — SODIUM CHLORIDE 0.9 % (FLUSH) 0.9 %
5-40 SYRINGE (ML) INJECTION PRN
Status: DISCONTINUED | OUTPATIENT
Start: 2022-12-18 | End: 2022-12-19 | Stop reason: HOSPADM

## 2022-12-18 RX ADMIN — ERTAPENEM SODIUM 1000 MG: 1 INJECTION, POWDER, LYOPHILIZED, FOR SOLUTION INTRAMUSCULAR; INTRAVENOUS at 08:10

## 2022-12-18 RX ADMIN — Medication 300 UNITS: at 08:15

## 2022-12-18 RX ADMIN — SODIUM CHLORIDE, PRESERVATIVE FREE 10 ML: 5 INJECTION INTRAVENOUS at 08:15

## 2022-12-18 RX ADMIN — SODIUM CHLORIDE, PRESERVATIVE FREE 10 ML: 5 INJECTION INTRAVENOUS at 08:09

## 2022-12-18 ASSESSMENT — PAIN SCALES - GENERAL: PAINLEVEL_OUTOF10: 0

## 2022-12-19 ENCOUNTER — HOSPITAL ENCOUNTER (OUTPATIENT)
Dept: INFUSION THERAPY | Age: 52
Setting detail: INFUSION SERIES
Discharge: HOME | End: 2022-12-19
Payer: COMMERCIAL

## 2022-12-19 VITALS
RESPIRATION RATE: 16 BRPM | SYSTOLIC BLOOD PRESSURE: 113 MMHG | DIASTOLIC BLOOD PRESSURE: 70 MMHG | TEMPERATURE: 97.6 F | OXYGEN SATURATION: 97 % | HEIGHT: 67 IN | HEART RATE: 77 BPM | WEIGHT: 190 LBS | BODY MASS INDEX: 29.82 KG/M2

## 2022-12-19 DIAGNOSIS — M00.9 PYOGENIC ARTHRITIS OF LEFT HAND, DUE TO UNSPECIFIED ORGANISM (HCC): Primary | ICD-10-CM

## 2022-12-19 LAB
ALBUMIN SERPL-MCNC: 4 G/DL (ref 3.5–5.2)
ALP BLD-CCNC: 59 U/L (ref 40–129)
ALT SERPL-CCNC: 45 U/L (ref 0–40)
ANION GAP SERPL CALCULATED.3IONS-SCNC: 10 MMOL/L (ref 7–16)
AST SERPL-CCNC: 24 U/L (ref 0–39)
BASOPHILS ABSOLUTE: 0.03 E9/L (ref 0–0.2)
BASOPHILS RELATIVE PERCENT: 0.5 % (ref 0–2)
BILIRUB SERPL-MCNC: 0.2 MG/DL (ref 0–1.2)
BUN BLDV-MCNC: 18 MG/DL (ref 6–20)
C-REACTIVE PROTEIN: <0.3 MG/DL (ref 0–0.4)
CALCIUM SERPL-MCNC: 9.5 MG/DL (ref 8.6–10.2)
CHLORIDE BLD-SCNC: 105 MMOL/L (ref 98–107)
CO2: 26 MMOL/L (ref 22–29)
CREAT SERPL-MCNC: 1 MG/DL (ref 0.7–1.2)
EOSINOPHILS ABSOLUTE: 0.1 E9/L (ref 0.05–0.5)
EOSINOPHILS RELATIVE PERCENT: 1.6 % (ref 0–6)
GFR SERPL CREATININE-BSD FRML MDRD: >60 ML/MIN/1.73
GLUCOSE BLD-MCNC: 96 MG/DL (ref 74–99)
HCT VFR BLD CALC: 40.2 % (ref 37–54)
HEMOGLOBIN: 14 G/DL (ref 12.5–16.5)
IMMATURE GRANULOCYTES #: 0.02 E9/L
IMMATURE GRANULOCYTES %: 0.3 % (ref 0–5)
LYMPHOCYTES ABSOLUTE: 2.61 E9/L (ref 1.5–4)
LYMPHOCYTES RELATIVE PERCENT: 42.6 % (ref 20–42)
MCH RBC QN AUTO: 30.5 PG (ref 26–35)
MCHC RBC AUTO-ENTMCNC: 34.8 % (ref 32–34.5)
MCV RBC AUTO: 87.6 FL (ref 80–99.9)
MONOCYTES ABSOLUTE: 0.55 E9/L (ref 0.1–0.95)
MONOCYTES RELATIVE PERCENT: 9 % (ref 2–12)
NEUTROPHILS ABSOLUTE: 2.82 E9/L (ref 1.8–7.3)
NEUTROPHILS RELATIVE PERCENT: 46 % (ref 43–80)
PDW BLD-RTO: 11.5 FL (ref 11.5–15)
PLATELET # BLD: 476 E9/L (ref 130–450)
PMV BLD AUTO: 9 FL (ref 7–12)
POTASSIUM SERPL-SCNC: 3.9 MMOL/L (ref 3.5–5)
RBC # BLD: 4.59 E12/L (ref 3.8–5.8)
SEDIMENTATION RATE, ERYTHROCYTE: 7 MM/HR (ref 0–15)
SODIUM BLD-SCNC: 141 MMOL/L (ref 132–146)
TOTAL PROTEIN: 6.5 G/DL (ref 6.4–8.3)
WBC # BLD: 6.1 E9/L (ref 4.5–11.5)

## 2022-12-19 PROCEDURE — 80053 COMPREHEN METABOLIC PANEL: CPT

## 2022-12-19 PROCEDURE — 85651 RBC SED RATE NONAUTOMATED: CPT

## 2022-12-19 PROCEDURE — 36415 COLL VENOUS BLD VENIPUNCTURE: CPT

## 2022-12-19 PROCEDURE — 96374 THER/PROPH/DIAG INJ IV PUSH: CPT

## 2022-12-19 PROCEDURE — 6360000002 HC RX W HCPCS

## 2022-12-19 PROCEDURE — 2580000003 HC RX 258

## 2022-12-19 PROCEDURE — 86140 C-REACTIVE PROTEIN: CPT

## 2022-12-19 PROCEDURE — 85025 COMPLETE CBC W/AUTO DIFF WBC: CPT

## 2022-12-19 PROCEDURE — A4216 STERILE WATER/SALINE, 10 ML: HCPCS

## 2022-12-19 RX ORDER — DIPHENHYDRAMINE HYDROCHLORIDE 50 MG/ML
50 INJECTION INTRAMUSCULAR; INTRAVENOUS
Status: CANCELLED | OUTPATIENT
Start: 2022-12-20

## 2022-12-19 RX ORDER — EPINEPHRINE 1 MG/ML
0.3 INJECTION, SOLUTION, CONCENTRATE INTRAVENOUS PRN
Status: CANCELLED | OUTPATIENT
Start: 2022-12-20

## 2022-12-19 RX ORDER — SODIUM CHLORIDE 0.9 % (FLUSH) 0.9 %
5-40 SYRINGE (ML) INJECTION PRN
Status: CANCELLED | OUTPATIENT
Start: 2022-12-20

## 2022-12-19 RX ORDER — HEPARIN SODIUM (PORCINE) LOCK FLUSH IV SOLN 100 UNIT/ML 100 UNIT/ML
300 SOLUTION INTRAVENOUS PRN
Status: CANCELLED | OUTPATIENT
Start: 2022-12-20

## 2022-12-19 RX ORDER — SODIUM CHLORIDE 0.9 % (FLUSH) 0.9 %
5-40 SYRINGE (ML) INJECTION PRN
Status: DISCONTINUED | OUTPATIENT
Start: 2022-12-19 | End: 2022-12-20 | Stop reason: HOSPADM

## 2022-12-19 RX ORDER — DIPHENHYDRAMINE HCL 25 MG
50 TABLET ORAL
Status: CANCELLED
Start: 2022-12-20

## 2022-12-19 RX ORDER — HEPARIN SODIUM (PORCINE) LOCK FLUSH IV SOLN 100 UNIT/ML 100 UNIT/ML
300 SOLUTION INTRAVENOUS PRN
Status: DISCONTINUED | OUTPATIENT
Start: 2022-12-19 | End: 2022-12-20 | Stop reason: HOSPADM

## 2022-12-19 RX ADMIN — SODIUM CHLORIDE, PRESERVATIVE FREE 10 ML: 5 INJECTION INTRAVENOUS at 07:17

## 2022-12-19 RX ADMIN — SODIUM CHLORIDE, PRESERVATIVE FREE 10 ML: 5 INJECTION INTRAVENOUS at 07:15

## 2022-12-19 RX ADMIN — ERTAPENEM SODIUM 1000 MG: 1 INJECTION, POWDER, LYOPHILIZED, FOR SOLUTION INTRAMUSCULAR; INTRAVENOUS at 07:19

## 2022-12-19 RX ADMIN — Medication 300 UNITS: at 07:25

## 2022-12-19 RX ADMIN — SODIUM CHLORIDE, PRESERVATIVE FREE 10 ML: 5 INJECTION INTRAVENOUS at 07:24

## 2022-12-20 ENCOUNTER — HOSPITAL ENCOUNTER (OUTPATIENT)
Dept: INFUSION THERAPY | Age: 52
Setting detail: INFUSION SERIES
Discharge: HOME | End: 2022-12-20
Payer: COMMERCIAL

## 2022-12-20 VITALS
WEIGHT: 190 LBS | SYSTOLIC BLOOD PRESSURE: 114 MMHG | BODY MASS INDEX: 29.76 KG/M2 | TEMPERATURE: 97.7 F | RESPIRATION RATE: 16 BRPM | DIASTOLIC BLOOD PRESSURE: 65 MMHG | HEART RATE: 77 BPM

## 2022-12-20 DIAGNOSIS — M00.9 PYOGENIC ARTHRITIS OF LEFT HAND, DUE TO UNSPECIFIED ORGANISM (HCC): Primary | ICD-10-CM

## 2022-12-20 PROCEDURE — 96374 THER/PROPH/DIAG INJ IV PUSH: CPT

## 2022-12-20 PROCEDURE — A4216 STERILE WATER/SALINE, 10 ML: HCPCS

## 2022-12-20 PROCEDURE — 2580000003 HC RX 258

## 2022-12-20 PROCEDURE — 6360000002 HC RX W HCPCS

## 2022-12-20 RX ORDER — DIPHENHYDRAMINE HYDROCHLORIDE 50 MG/ML
50 INJECTION INTRAMUSCULAR; INTRAVENOUS
Status: CANCELLED | OUTPATIENT
Start: 2022-12-21

## 2022-12-20 RX ORDER — SODIUM CHLORIDE 0.9 % (FLUSH) 0.9 %
5-40 SYRINGE (ML) INJECTION PRN
Status: CANCELLED | OUTPATIENT
Start: 2022-12-21

## 2022-12-20 RX ORDER — SODIUM CHLORIDE 0.9 % (FLUSH) 0.9 %
5-40 SYRINGE (ML) INJECTION PRN
Status: DISCONTINUED | OUTPATIENT
Start: 2022-12-20 | End: 2022-12-21 | Stop reason: HOSPADM

## 2022-12-20 RX ORDER — DIPHENHYDRAMINE HCL 25 MG
50 TABLET ORAL
Status: CANCELLED
Start: 2022-12-21

## 2022-12-20 RX ORDER — EPINEPHRINE 1 MG/ML
0.3 INJECTION, SOLUTION, CONCENTRATE INTRAVENOUS PRN
Status: CANCELLED | OUTPATIENT
Start: 2022-12-21

## 2022-12-20 RX ORDER — HEPARIN SODIUM (PORCINE) LOCK FLUSH IV SOLN 100 UNIT/ML 100 UNIT/ML
300 SOLUTION INTRAVENOUS PRN
Status: DISCONTINUED | OUTPATIENT
Start: 2022-12-20 | End: 2022-12-21 | Stop reason: HOSPADM

## 2022-12-20 RX ORDER — HEPARIN SODIUM (PORCINE) LOCK FLUSH IV SOLN 100 UNIT/ML 100 UNIT/ML
300 SOLUTION INTRAVENOUS PRN
Status: CANCELLED | OUTPATIENT
Start: 2022-12-21

## 2022-12-20 RX ADMIN — Medication 300 UNITS: at 07:24

## 2022-12-20 RX ADMIN — SODIUM CHLORIDE, PRESERVATIVE FREE 10 ML: 5 INJECTION INTRAVENOUS at 07:14

## 2022-12-20 RX ADMIN — SODIUM CHLORIDE, PRESERVATIVE FREE 10 ML: 5 INJECTION INTRAVENOUS at 07:24

## 2022-12-20 RX ADMIN — ERTAPENEM SODIUM 1000 MG: 1 INJECTION, POWDER, LYOPHILIZED, FOR SOLUTION INTRAMUSCULAR; INTRAVENOUS at 07:15

## 2022-12-20 ASSESSMENT — PAIN DESCRIPTION - ORIENTATION: ORIENTATION: LEFT

## 2022-12-20 ASSESSMENT — PAIN DESCRIPTION - LOCATION: LOCATION: ARM

## 2022-12-20 ASSESSMENT — PAIN DESCRIPTION - PAIN TYPE: TYPE: SURGICAL PAIN

## 2022-12-20 NOTE — PROGRESS NOTES
Tolerated  iv push  med well. Reviewed therapy plan, offered education material and/or discharge material, reviewed medication information and signs and symptoms  and educated on possible side effects, verbalizes good knowledge of current plan patient verbalizes understanding, and has no signs or symptoms to report at this time. Patient discharged. Patient alert and oriented x3. No distress noted. Vital signs stable. Patient denies any new or worsening pain. Patient denies any needs. All questions answered. Next appointment scheduled. declinesopy of AVS. Patient instructed on s/s infection/complication with midline to report and instructed on keeping midline dressing clean, dry and intact patient verbalizes understanding.

## 2022-12-21 ENCOUNTER — HOSPITAL ENCOUNTER (OUTPATIENT)
Dept: INFUSION THERAPY | Age: 52
Setting detail: INFUSION SERIES
Discharge: HOME | End: 2022-12-21
Payer: COMMERCIAL

## 2022-12-21 VITALS
SYSTOLIC BLOOD PRESSURE: 117 MMHG | RESPIRATION RATE: 16 BRPM | DIASTOLIC BLOOD PRESSURE: 74 MMHG | HEART RATE: 74 BPM | OXYGEN SATURATION: 99 % | BODY MASS INDEX: 29.82 KG/M2 | HEIGHT: 67 IN | WEIGHT: 190 LBS | TEMPERATURE: 97.6 F

## 2022-12-21 DIAGNOSIS — M00.9 PYOGENIC ARTHRITIS OF LEFT HAND, DUE TO UNSPECIFIED ORGANISM (HCC): Primary | ICD-10-CM

## 2022-12-21 PROCEDURE — 96374 THER/PROPH/DIAG INJ IV PUSH: CPT

## 2022-12-21 PROCEDURE — A4216 STERILE WATER/SALINE, 10 ML: HCPCS

## 2022-12-21 PROCEDURE — 2580000003 HC RX 258

## 2022-12-21 PROCEDURE — 6360000002 HC RX W HCPCS

## 2022-12-21 RX ORDER — HEPARIN SODIUM (PORCINE) LOCK FLUSH IV SOLN 100 UNIT/ML 100 UNIT/ML
300 SOLUTION INTRAVENOUS PRN
Status: CANCELLED | OUTPATIENT
Start: 2022-12-22

## 2022-12-21 RX ORDER — DIPHENHYDRAMINE HYDROCHLORIDE 50 MG/ML
50 INJECTION INTRAMUSCULAR; INTRAVENOUS
Status: CANCELLED | OUTPATIENT
Start: 2022-12-22

## 2022-12-21 RX ORDER — SODIUM CHLORIDE 0.9 % (FLUSH) 0.9 %
5-40 SYRINGE (ML) INJECTION PRN
Status: CANCELLED | OUTPATIENT
Start: 2022-12-22

## 2022-12-21 RX ORDER — SODIUM CHLORIDE 0.9 % (FLUSH) 0.9 %
5-40 SYRINGE (ML) INJECTION PRN
Status: DISCONTINUED | OUTPATIENT
Start: 2022-12-21 | End: 2022-12-22 | Stop reason: HOSPADM

## 2022-12-21 RX ORDER — DIPHENHYDRAMINE HCL 25 MG
50 TABLET ORAL
Status: CANCELLED
Start: 2022-12-22

## 2022-12-21 RX ORDER — EPINEPHRINE 1 MG/ML
0.3 INJECTION, SOLUTION, CONCENTRATE INTRAVENOUS PRN
Status: CANCELLED | OUTPATIENT
Start: 2022-12-22

## 2022-12-21 RX ORDER — HEPARIN SODIUM (PORCINE) LOCK FLUSH IV SOLN 100 UNIT/ML 100 UNIT/ML
300 SOLUTION INTRAVENOUS PRN
Status: DISCONTINUED | OUTPATIENT
Start: 2022-12-21 | End: 2022-12-22 | Stop reason: HOSPADM

## 2022-12-21 RX ADMIN — SODIUM CHLORIDE, PRESERVATIVE FREE 10 ML: 5 INJECTION INTRAVENOUS at 07:16

## 2022-12-21 RX ADMIN — Medication 300 UNITS: at 07:24

## 2022-12-21 RX ADMIN — ERTAPENEM SODIUM 1000 MG: 1 INJECTION, POWDER, LYOPHILIZED, FOR SOLUTION INTRAMUSCULAR; INTRAVENOUS at 07:18

## 2022-12-21 RX ADMIN — SODIUM CHLORIDE, PRESERVATIVE FREE 10 ML: 5 INJECTION INTRAVENOUS at 07:23

## 2022-12-22 ENCOUNTER — HOSPITAL ENCOUNTER (OUTPATIENT)
Dept: INFUSION THERAPY | Age: 52
Setting detail: INFUSION SERIES
Discharge: HOME | End: 2022-12-22
Payer: COMMERCIAL

## 2022-12-22 VITALS
SYSTOLIC BLOOD PRESSURE: 122 MMHG | TEMPERATURE: 98.1 F | RESPIRATION RATE: 16 BRPM | OXYGEN SATURATION: 100 % | DIASTOLIC BLOOD PRESSURE: 72 MMHG | HEART RATE: 67 BPM

## 2022-12-22 DIAGNOSIS — M00.9 PYOGENIC ARTHRITIS OF LEFT HAND, DUE TO UNSPECIFIED ORGANISM (HCC): Primary | ICD-10-CM

## 2022-12-22 LAB
ALBUMIN SERPL-MCNC: 4.2 G/DL (ref 3.5–5.2)
ALP BLD-CCNC: 63 U/L (ref 40–129)
ALT SERPL-CCNC: 56 U/L (ref 0–40)
ANION GAP SERPL CALCULATED.3IONS-SCNC: 11 MMOL/L (ref 7–16)
AST SERPL-CCNC: 35 U/L (ref 0–39)
BASOPHILS ABSOLUTE: 0.05 E9/L (ref 0–0.2)
BASOPHILS RELATIVE PERCENT: 0.9 % (ref 0–2)
BILIRUB SERPL-MCNC: 0.3 MG/DL (ref 0–1.2)
BUN BLDV-MCNC: 18 MG/DL (ref 6–20)
CALCIUM SERPL-MCNC: 9.4 MG/DL (ref 8.6–10.2)
CHLORIDE BLD-SCNC: 104 MMOL/L (ref 98–107)
CO2: 26 MMOL/L (ref 22–29)
CREAT SERPL-MCNC: 1 MG/DL (ref 0.7–1.2)
EOSINOPHILS ABSOLUTE: 0.13 E9/L (ref 0.05–0.5)
EOSINOPHILS RELATIVE PERCENT: 2.2 % (ref 0–6)
GFR SERPL CREATININE-BSD FRML MDRD: >60 ML/MIN/1.73
GLUCOSE BLD-MCNC: 97 MG/DL (ref 74–99)
HCT VFR BLD CALC: 41.3 % (ref 37–54)
HEMOGLOBIN: 14.6 G/DL (ref 12.5–16.5)
IMMATURE GRANULOCYTES #: 0.02 E9/L
IMMATURE GRANULOCYTES %: 0.3 % (ref 0–5)
LYMPHOCYTES ABSOLUTE: 2.56 E9/L (ref 1.5–4)
LYMPHOCYTES RELATIVE PERCENT: 43.6 % (ref 20–42)
MCH RBC QN AUTO: 30.7 PG (ref 26–35)
MCHC RBC AUTO-ENTMCNC: 35.4 % (ref 32–34.5)
MCV RBC AUTO: 86.9 FL (ref 80–99.9)
MONOCYTES ABSOLUTE: 0.43 E9/L (ref 0.1–0.95)
MONOCYTES RELATIVE PERCENT: 7.3 % (ref 2–12)
NEUTROPHILS ABSOLUTE: 2.68 E9/L (ref 1.8–7.3)
NEUTROPHILS RELATIVE PERCENT: 45.7 % (ref 43–80)
PDW BLD-RTO: 11.5 FL (ref 11.5–15)
PLATELET # BLD: 460 E9/L (ref 130–450)
PMV BLD AUTO: 9.3 FL (ref 7–12)
POTASSIUM SERPL-SCNC: 4 MMOL/L (ref 3.5–5)
RBC # BLD: 4.75 E12/L (ref 3.8–5.8)
SODIUM BLD-SCNC: 141 MMOL/L (ref 132–146)
TOTAL PROTEIN: 7.1 G/DL (ref 6.4–8.3)
WBC # BLD: 5.9 E9/L (ref 4.5–11.5)

## 2022-12-22 PROCEDURE — A4216 STERILE WATER/SALINE, 10 ML: HCPCS

## 2022-12-22 PROCEDURE — 85025 COMPLETE CBC W/AUTO DIFF WBC: CPT

## 2022-12-22 PROCEDURE — 2580000003 HC RX 258

## 2022-12-22 PROCEDURE — 80053 COMPREHEN METABOLIC PANEL: CPT

## 2022-12-22 PROCEDURE — 96374 THER/PROPH/DIAG INJ IV PUSH: CPT

## 2022-12-22 PROCEDURE — 36415 COLL VENOUS BLD VENIPUNCTURE: CPT

## 2022-12-22 PROCEDURE — 6360000002 HC RX W HCPCS

## 2022-12-22 RX ORDER — EPINEPHRINE 1 MG/ML
0.3 INJECTION, SOLUTION, CONCENTRATE INTRAVENOUS PRN
Status: CANCELLED | OUTPATIENT
Start: 2022-12-23

## 2022-12-22 RX ORDER — DIPHENHYDRAMINE HYDROCHLORIDE 50 MG/ML
50 INJECTION INTRAMUSCULAR; INTRAVENOUS
Status: CANCELLED | OUTPATIENT
Start: 2022-12-23

## 2022-12-22 RX ORDER — HEPARIN SODIUM (PORCINE) LOCK FLUSH IV SOLN 100 UNIT/ML 100 UNIT/ML
300 SOLUTION INTRAVENOUS PRN
Status: DISCONTINUED | OUTPATIENT
Start: 2022-12-22 | End: 2022-12-23 | Stop reason: HOSPADM

## 2022-12-22 RX ORDER — HEPARIN SODIUM (PORCINE) LOCK FLUSH IV SOLN 100 UNIT/ML 100 UNIT/ML
300 SOLUTION INTRAVENOUS PRN
Status: CANCELLED | OUTPATIENT
Start: 2022-12-23

## 2022-12-22 RX ORDER — DIPHENHYDRAMINE HCL 25 MG
50 TABLET ORAL
Status: CANCELLED
Start: 2022-12-23

## 2022-12-22 RX ORDER — SODIUM CHLORIDE 0.9 % (FLUSH) 0.9 %
5-40 SYRINGE (ML) INJECTION PRN
Status: DISCONTINUED | OUTPATIENT
Start: 2022-12-22 | End: 2022-12-23 | Stop reason: HOSPADM

## 2022-12-22 RX ORDER — SODIUM CHLORIDE 0.9 % (FLUSH) 0.9 %
5-40 SYRINGE (ML) INJECTION PRN
Status: CANCELLED | OUTPATIENT
Start: 2022-12-23

## 2022-12-22 RX ADMIN — SODIUM CHLORIDE, PRESERVATIVE FREE 10 ML: 5 INJECTION INTRAVENOUS at 07:11

## 2022-12-22 RX ADMIN — ERTAPENEM SODIUM 1000 MG: 1 INJECTION, POWDER, LYOPHILIZED, FOR SOLUTION INTRAMUSCULAR; INTRAVENOUS at 07:15

## 2022-12-22 RX ADMIN — SODIUM CHLORIDE, PRESERVATIVE FREE 10 ML: 5 INJECTION INTRAVENOUS at 07:09

## 2022-12-22 RX ADMIN — SODIUM CHLORIDE, PRESERVATIVE FREE 10 ML: 5 INJECTION INTRAVENOUS at 07:23

## 2022-12-22 RX ADMIN — Medication 300 UNITS: at 07:24

## 2022-12-23 ENCOUNTER — TELEPHONE (OUTPATIENT)
Dept: PHYSICAL THERAPY | Age: 52
End: 2022-12-23

## 2022-12-23 ENCOUNTER — HOSPITAL ENCOUNTER (OUTPATIENT)
Dept: INFUSION THERAPY | Age: 52
Setting detail: INFUSION SERIES
Discharge: HOME | End: 2022-12-23
Payer: COMMERCIAL

## 2022-12-23 VITALS
RESPIRATION RATE: 16 BRPM | BODY MASS INDEX: 29.82 KG/M2 | SYSTOLIC BLOOD PRESSURE: 121 MMHG | OXYGEN SATURATION: 99 % | TEMPERATURE: 97.4 F | HEIGHT: 67 IN | DIASTOLIC BLOOD PRESSURE: 73 MMHG | WEIGHT: 190 LBS | HEART RATE: 79 BPM

## 2022-12-23 DIAGNOSIS — M00.9 PYOGENIC ARTHRITIS OF LEFT HAND, DUE TO UNSPECIFIED ORGANISM (HCC): Primary | ICD-10-CM

## 2022-12-23 PROCEDURE — 6360000002 HC RX W HCPCS

## 2022-12-23 PROCEDURE — A4216 STERILE WATER/SALINE, 10 ML: HCPCS

## 2022-12-23 PROCEDURE — 2580000003 HC RX 258

## 2022-12-23 PROCEDURE — 96374 THER/PROPH/DIAG INJ IV PUSH: CPT

## 2022-12-23 RX ORDER — EPINEPHRINE 1 MG/ML
0.3 INJECTION, SOLUTION, CONCENTRATE INTRAVENOUS PRN
Status: CANCELLED | OUTPATIENT
Start: 2022-12-24

## 2022-12-23 RX ORDER — HEPARIN SODIUM (PORCINE) LOCK FLUSH IV SOLN 100 UNIT/ML 100 UNIT/ML
300 SOLUTION INTRAVENOUS PRN
Status: DISCONTINUED | OUTPATIENT
Start: 2022-12-23 | End: 2022-12-24 | Stop reason: HOSPADM

## 2022-12-23 RX ORDER — HEPARIN SODIUM (PORCINE) LOCK FLUSH IV SOLN 100 UNIT/ML 100 UNIT/ML
300 SOLUTION INTRAVENOUS PRN
Status: CANCELLED | OUTPATIENT
Start: 2022-12-24

## 2022-12-23 RX ORDER — SODIUM CHLORIDE 0.9 % (FLUSH) 0.9 %
5-40 SYRINGE (ML) INJECTION PRN
Status: DISCONTINUED | OUTPATIENT
Start: 2022-12-23 | End: 2022-12-24 | Stop reason: HOSPADM

## 2022-12-23 RX ORDER — SODIUM CHLORIDE 0.9 % (FLUSH) 0.9 %
5-40 SYRINGE (ML) INJECTION PRN
Status: CANCELLED | OUTPATIENT
Start: 2022-12-24

## 2022-12-23 RX ORDER — DIPHENHYDRAMINE HYDROCHLORIDE 50 MG/ML
50 INJECTION INTRAMUSCULAR; INTRAVENOUS
Status: CANCELLED | OUTPATIENT
Start: 2022-12-24

## 2022-12-23 RX ORDER — DIPHENHYDRAMINE HCL 25 MG
50 TABLET ORAL
Status: CANCELLED
Start: 2022-12-24

## 2022-12-23 RX ADMIN — ERTAPENEM SODIUM 1000 MG: 1 INJECTION, POWDER, LYOPHILIZED, FOR SOLUTION INTRAMUSCULAR; INTRAVENOUS at 07:11

## 2022-12-23 RX ADMIN — SODIUM CHLORIDE, PRESERVATIVE FREE 10 ML: 5 INJECTION INTRAVENOUS at 07:08

## 2022-12-23 RX ADMIN — Medication 300 UNITS: at 07:17

## 2022-12-23 RX ADMIN — SODIUM CHLORIDE, PRESERVATIVE FREE 10 ML: 5 INJECTION INTRAVENOUS at 07:16

## 2022-12-24 ENCOUNTER — HOSPITAL ENCOUNTER (OUTPATIENT)
Dept: INFUSION THERAPY | Age: 52
Setting detail: INFUSION SERIES
Discharge: HOME | End: 2022-12-24
Payer: COMMERCIAL

## 2022-12-24 VITALS
WEIGHT: 190 LBS | OXYGEN SATURATION: 97 % | HEIGHT: 67 IN | SYSTOLIC BLOOD PRESSURE: 134 MMHG | RESPIRATION RATE: 16 BRPM | DIASTOLIC BLOOD PRESSURE: 84 MMHG | HEART RATE: 79 BPM | BODY MASS INDEX: 29.82 KG/M2 | TEMPERATURE: 97.3 F

## 2022-12-24 DIAGNOSIS — M00.9 PYOGENIC ARTHRITIS OF LEFT HAND, DUE TO UNSPECIFIED ORGANISM (HCC): Primary | ICD-10-CM

## 2022-12-24 PROCEDURE — 6360000002 HC RX W HCPCS

## 2022-12-24 PROCEDURE — 2580000003 HC RX 258

## 2022-12-24 PROCEDURE — 96374 THER/PROPH/DIAG INJ IV PUSH: CPT

## 2022-12-24 PROCEDURE — A4216 STERILE WATER/SALINE, 10 ML: HCPCS

## 2022-12-24 RX ORDER — SODIUM CHLORIDE 0.9 % (FLUSH) 0.9 %
5-40 SYRINGE (ML) INJECTION PRN
Status: CANCELLED | OUTPATIENT
Start: 2022-12-25

## 2022-12-24 RX ORDER — DIPHENHYDRAMINE HYDROCHLORIDE 50 MG/ML
50 INJECTION INTRAMUSCULAR; INTRAVENOUS
Status: CANCELLED | OUTPATIENT
Start: 2022-12-25

## 2022-12-24 RX ORDER — SODIUM CHLORIDE 0.9 % (FLUSH) 0.9 %
5-40 SYRINGE (ML) INJECTION PRN
Status: DISCONTINUED | OUTPATIENT
Start: 2022-12-24 | End: 2022-12-25 | Stop reason: HOSPADM

## 2022-12-24 RX ORDER — HEPARIN SODIUM (PORCINE) LOCK FLUSH IV SOLN 100 UNIT/ML 100 UNIT/ML
300 SOLUTION INTRAVENOUS PRN
Status: DISCONTINUED | OUTPATIENT
Start: 2022-12-24 | End: 2022-12-25 | Stop reason: HOSPADM

## 2022-12-24 RX ORDER — EPINEPHRINE 1 MG/ML
0.3 INJECTION, SOLUTION, CONCENTRATE INTRAVENOUS PRN
Status: CANCELLED | OUTPATIENT
Start: 2022-12-25

## 2022-12-24 RX ORDER — DIPHENHYDRAMINE HCL 25 MG
50 TABLET ORAL
Status: CANCELLED
Start: 2022-12-25

## 2022-12-24 RX ORDER — HEPARIN SODIUM (PORCINE) LOCK FLUSH IV SOLN 100 UNIT/ML 100 UNIT/ML
300 SOLUTION INTRAVENOUS PRN
Status: CANCELLED | OUTPATIENT
Start: 2022-12-25

## 2022-12-24 RX ADMIN — Medication 300 UNITS: at 07:48

## 2022-12-24 RX ADMIN — SODIUM CHLORIDE, PRESERVATIVE FREE 10 ML: 5 INJECTION INTRAVENOUS at 07:47

## 2022-12-24 RX ADMIN — ERTAPENEM SODIUM 1000 MG: 1 INJECTION, POWDER, LYOPHILIZED, FOR SOLUTION INTRAMUSCULAR; INTRAVENOUS at 07:42

## 2022-12-24 RX ADMIN — SODIUM CHLORIDE, PRESERVATIVE FREE 10 ML: 5 INJECTION INTRAVENOUS at 07:42

## 2022-12-25 ENCOUNTER — HOSPITAL ENCOUNTER (OUTPATIENT)
Dept: INFUSION THERAPY | Age: 52
Setting detail: INFUSION SERIES
Discharge: HOME | End: 2022-12-25
Payer: COMMERCIAL

## 2022-12-25 VITALS
BODY MASS INDEX: 29.82 KG/M2 | OXYGEN SATURATION: 98 % | HEART RATE: 79 BPM | DIASTOLIC BLOOD PRESSURE: 75 MMHG | HEIGHT: 67 IN | SYSTOLIC BLOOD PRESSURE: 133 MMHG | TEMPERATURE: 96 F | WEIGHT: 190 LBS | RESPIRATION RATE: 16 BRPM

## 2022-12-25 DIAGNOSIS — M00.9 PYOGENIC ARTHRITIS OF LEFT HAND, DUE TO UNSPECIFIED ORGANISM (HCC): Primary | ICD-10-CM

## 2022-12-25 PROCEDURE — 6360000002 HC RX W HCPCS

## 2022-12-25 PROCEDURE — 96374 THER/PROPH/DIAG INJ IV PUSH: CPT

## 2022-12-25 PROCEDURE — 2580000003 HC RX 258

## 2022-12-25 PROCEDURE — A4216 STERILE WATER/SALINE, 10 ML: HCPCS

## 2022-12-25 RX ORDER — DIPHENHYDRAMINE HCL 25 MG
50 TABLET ORAL
Status: CANCELLED
Start: 2022-12-26

## 2022-12-25 RX ORDER — SODIUM CHLORIDE 0.9 % (FLUSH) 0.9 %
5-40 SYRINGE (ML) INJECTION PRN
Status: CANCELLED | OUTPATIENT
Start: 2022-12-26

## 2022-12-25 RX ORDER — HEPARIN SODIUM (PORCINE) LOCK FLUSH IV SOLN 100 UNIT/ML 100 UNIT/ML
300 SOLUTION INTRAVENOUS PRN
Status: CANCELLED | OUTPATIENT
Start: 2022-12-26

## 2022-12-25 RX ORDER — SODIUM CHLORIDE 0.9 % (FLUSH) 0.9 %
5-40 SYRINGE (ML) INJECTION PRN
Status: DISCONTINUED | OUTPATIENT
Start: 2022-12-25 | End: 2022-12-26 | Stop reason: HOSPADM

## 2022-12-25 RX ORDER — HEPARIN SODIUM (PORCINE) LOCK FLUSH IV SOLN 100 UNIT/ML 100 UNIT/ML
300 SOLUTION INTRAVENOUS PRN
Status: DISCONTINUED | OUTPATIENT
Start: 2022-12-25 | End: 2022-12-26 | Stop reason: HOSPADM

## 2022-12-25 RX ORDER — EPINEPHRINE 1 MG/ML
0.3 INJECTION, SOLUTION, CONCENTRATE INTRAVENOUS PRN
Status: CANCELLED | OUTPATIENT
Start: 2022-12-26

## 2022-12-25 RX ORDER — DIPHENHYDRAMINE HYDROCHLORIDE 50 MG/ML
50 INJECTION INTRAMUSCULAR; INTRAVENOUS
Status: CANCELLED | OUTPATIENT
Start: 2022-12-26

## 2022-12-25 RX ADMIN — Medication 300 UNITS: at 07:38

## 2022-12-25 RX ADMIN — ERTAPENEM SODIUM 1000 MG: 1 INJECTION, POWDER, LYOPHILIZED, FOR SOLUTION INTRAMUSCULAR; INTRAVENOUS at 07:33

## 2022-12-25 RX ADMIN — SODIUM CHLORIDE, PRESERVATIVE FREE 10 ML: 5 INJECTION INTRAVENOUS at 07:38

## 2022-12-25 RX ADMIN — SODIUM CHLORIDE, PRESERVATIVE FREE 10 ML: 5 INJECTION INTRAVENOUS at 07:31

## 2022-12-26 ENCOUNTER — HOSPITAL ENCOUNTER (OUTPATIENT)
Dept: INFUSION THERAPY | Age: 52
Setting detail: INFUSION SERIES
Discharge: HOME | End: 2022-12-26
Payer: COMMERCIAL

## 2022-12-26 VITALS
HEART RATE: 77 BPM | HEIGHT: 67 IN | TEMPERATURE: 97.1 F | DIASTOLIC BLOOD PRESSURE: 77 MMHG | RESPIRATION RATE: 16 BRPM | BODY MASS INDEX: 29.82 KG/M2 | SYSTOLIC BLOOD PRESSURE: 120 MMHG | WEIGHT: 190 LBS | OXYGEN SATURATION: 99 %

## 2022-12-26 DIAGNOSIS — M00.9 PYOGENIC ARTHRITIS OF LEFT HAND, DUE TO UNSPECIFIED ORGANISM (HCC): Primary | ICD-10-CM

## 2022-12-26 LAB
ALBUMIN SERPL-MCNC: 3.9 G/DL (ref 3.5–5.2)
ALP BLD-CCNC: 55 U/L (ref 40–129)
ALT SERPL-CCNC: 50 U/L (ref 0–40)
ANION GAP SERPL CALCULATED.3IONS-SCNC: 7 MMOL/L (ref 7–16)
AST SERPL-CCNC: 28 U/L (ref 0–39)
BASOPHILS ABSOLUTE: 0.05 E9/L (ref 0–0.2)
BASOPHILS RELATIVE PERCENT: 1 % (ref 0–2)
BILIRUB SERPL-MCNC: 0.2 MG/DL (ref 0–1.2)
BUN BLDV-MCNC: 13 MG/DL (ref 6–20)
C-REACTIVE PROTEIN: <0.3 MG/DL (ref 0–0.4)
CALCIUM SERPL-MCNC: 9.1 MG/DL (ref 8.6–10.2)
CHLORIDE BLD-SCNC: 105 MMOL/L (ref 98–107)
CO2: 30 MMOL/L (ref 22–29)
CREAT SERPL-MCNC: 1 MG/DL (ref 0.7–1.2)
EOSINOPHILS ABSOLUTE: 0.16 E9/L (ref 0.05–0.5)
EOSINOPHILS RELATIVE PERCENT: 3.1 % (ref 0–6)
GFR SERPL CREATININE-BSD FRML MDRD: >60 ML/MIN/1.73
GLUCOSE BLD-MCNC: 88 MG/DL (ref 74–99)
HCT VFR BLD CALC: 40.5 % (ref 37–54)
HEMOGLOBIN: 13.9 G/DL (ref 12.5–16.5)
IMMATURE GRANULOCYTES #: 0.01 E9/L
IMMATURE GRANULOCYTES %: 0.2 % (ref 0–5)
LYMPHOCYTES ABSOLUTE: 2.32 E9/L (ref 1.5–4)
LYMPHOCYTES RELATIVE PERCENT: 45.6 % (ref 20–42)
MCH RBC QN AUTO: 30 PG (ref 26–35)
MCHC RBC AUTO-ENTMCNC: 34.3 % (ref 32–34.5)
MCV RBC AUTO: 87.5 FL (ref 80–99.9)
MONOCYTES ABSOLUTE: 0.4 E9/L (ref 0.1–0.95)
MONOCYTES RELATIVE PERCENT: 7.9 % (ref 2–12)
NEUTROPHILS ABSOLUTE: 2.15 E9/L (ref 1.8–7.3)
NEUTROPHILS RELATIVE PERCENT: 42.2 % (ref 43–80)
PDW BLD-RTO: 11.7 FL (ref 11.5–15)
PLATELET # BLD: 382 E9/L (ref 130–450)
PMV BLD AUTO: 9.5 FL (ref 7–12)
POTASSIUM SERPL-SCNC: 3.8 MMOL/L (ref 3.5–5)
RBC # BLD: 4.63 E12/L (ref 3.8–5.8)
SEDIMENTATION RATE, ERYTHROCYTE: 4 MM/HR (ref 0–15)
SODIUM BLD-SCNC: 142 MMOL/L (ref 132–146)
TOTAL PROTEIN: 6.4 G/DL (ref 6.4–8.3)
WBC # BLD: 5.1 E9/L (ref 4.5–11.5)

## 2022-12-26 PROCEDURE — A4216 STERILE WATER/SALINE, 10 ML: HCPCS

## 2022-12-26 PROCEDURE — 2580000003 HC RX 258

## 2022-12-26 PROCEDURE — 85025 COMPLETE CBC W/AUTO DIFF WBC: CPT

## 2022-12-26 PROCEDURE — 96374 THER/PROPH/DIAG INJ IV PUSH: CPT

## 2022-12-26 PROCEDURE — 86140 C-REACTIVE PROTEIN: CPT

## 2022-12-26 PROCEDURE — 80053 COMPREHEN METABOLIC PANEL: CPT

## 2022-12-26 PROCEDURE — 6360000002 HC RX W HCPCS

## 2022-12-26 PROCEDURE — 85651 RBC SED RATE NONAUTOMATED: CPT

## 2022-12-26 RX ORDER — SODIUM CHLORIDE 0.9 % (FLUSH) 0.9 %
5-40 SYRINGE (ML) INJECTION PRN
Status: DISCONTINUED | OUTPATIENT
Start: 2022-12-26 | End: 2022-12-27 | Stop reason: HOSPADM

## 2022-12-26 RX ORDER — EPINEPHRINE 1 MG/ML
0.3 INJECTION, SOLUTION, CONCENTRATE INTRAVENOUS PRN
Status: CANCELLED | OUTPATIENT
Start: 2022-12-27

## 2022-12-26 RX ORDER — DIPHENHYDRAMINE HCL 25 MG
50 TABLET ORAL
Status: CANCELLED
Start: 2022-12-27

## 2022-12-26 RX ORDER — HEPARIN SODIUM (PORCINE) LOCK FLUSH IV SOLN 100 UNIT/ML 100 UNIT/ML
300 SOLUTION INTRAVENOUS PRN
Status: DISCONTINUED | OUTPATIENT
Start: 2022-12-26 | End: 2022-12-27 | Stop reason: HOSPADM

## 2022-12-26 RX ORDER — SODIUM CHLORIDE 0.9 % (FLUSH) 0.9 %
5-40 SYRINGE (ML) INJECTION PRN
Status: CANCELLED | OUTPATIENT
Start: 2022-12-27

## 2022-12-26 RX ORDER — DIPHENHYDRAMINE HYDROCHLORIDE 50 MG/ML
50 INJECTION INTRAMUSCULAR; INTRAVENOUS
Status: CANCELLED | OUTPATIENT
Start: 2022-12-27

## 2022-12-26 RX ORDER — HEPARIN SODIUM (PORCINE) LOCK FLUSH IV SOLN 100 UNIT/ML 100 UNIT/ML
300 SOLUTION INTRAVENOUS PRN
Status: CANCELLED | OUTPATIENT
Start: 2022-12-27

## 2022-12-26 RX ADMIN — ERTAPENEM SODIUM 1000 MG: 1 INJECTION, POWDER, LYOPHILIZED, FOR SOLUTION INTRAMUSCULAR; INTRAVENOUS at 07:31

## 2022-12-26 RX ADMIN — SODIUM CHLORIDE, PRESERVATIVE FREE 10 ML: 5 INJECTION INTRAVENOUS at 07:31

## 2022-12-26 RX ADMIN — Medication 300 UNITS: at 07:37

## 2022-12-26 RX ADMIN — SODIUM CHLORIDE, PRESERVATIVE FREE 10 ML: 5 INJECTION INTRAVENOUS at 07:36

## 2022-12-26 RX ADMIN — SODIUM CHLORIDE, PRESERVATIVE FREE 10 ML: 5 INJECTION INTRAVENOUS at 07:29

## 2022-12-27 ENCOUNTER — HOSPITAL ENCOUNTER (OUTPATIENT)
Dept: INFUSION THERAPY | Age: 52
Setting detail: INFUSION SERIES
Discharge: HOME | End: 2022-12-27
Payer: COMMERCIAL

## 2022-12-27 VITALS
RESPIRATION RATE: 16 BRPM | TEMPERATURE: 98 F | SYSTOLIC BLOOD PRESSURE: 122 MMHG | DIASTOLIC BLOOD PRESSURE: 67 MMHG | HEART RATE: 69 BPM | OXYGEN SATURATION: 99 %

## 2022-12-27 DIAGNOSIS — M00.9 PYOGENIC ARTHRITIS OF LEFT HAND, DUE TO UNSPECIFIED ORGANISM (HCC): Primary | ICD-10-CM

## 2022-12-27 PROCEDURE — 2580000003 HC RX 258

## 2022-12-27 PROCEDURE — 6360000002 HC RX W HCPCS

## 2022-12-27 PROCEDURE — 96374 THER/PROPH/DIAG INJ IV PUSH: CPT

## 2022-12-27 PROCEDURE — A4216 STERILE WATER/SALINE, 10 ML: HCPCS

## 2022-12-27 RX ORDER — DIPHENHYDRAMINE HYDROCHLORIDE 50 MG/ML
50 INJECTION INTRAMUSCULAR; INTRAVENOUS
Status: CANCELLED | OUTPATIENT
Start: 2022-12-28

## 2022-12-27 RX ORDER — HEPARIN SODIUM (PORCINE) LOCK FLUSH IV SOLN 100 UNIT/ML 100 UNIT/ML
300 SOLUTION INTRAVENOUS PRN
Status: DISCONTINUED | OUTPATIENT
Start: 2022-12-27 | End: 2022-12-28 | Stop reason: HOSPADM

## 2022-12-27 RX ORDER — EPINEPHRINE 1 MG/ML
0.3 INJECTION, SOLUTION, CONCENTRATE INTRAVENOUS PRN
Status: CANCELLED | OUTPATIENT
Start: 2022-12-28

## 2022-12-27 RX ORDER — HEPARIN SODIUM (PORCINE) LOCK FLUSH IV SOLN 100 UNIT/ML 100 UNIT/ML
300 SOLUTION INTRAVENOUS PRN
Status: CANCELLED | OUTPATIENT
Start: 2022-12-28

## 2022-12-27 RX ORDER — SODIUM CHLORIDE 0.9 % (FLUSH) 0.9 %
5-40 SYRINGE (ML) INJECTION PRN
Status: CANCELLED | OUTPATIENT
Start: 2022-12-28

## 2022-12-27 RX ORDER — SODIUM CHLORIDE 0.9 % (FLUSH) 0.9 %
5-40 SYRINGE (ML) INJECTION PRN
Status: DISCONTINUED | OUTPATIENT
Start: 2022-12-27 | End: 2022-12-28 | Stop reason: HOSPADM

## 2022-12-27 RX ORDER — DIPHENHYDRAMINE HCL 25 MG
50 TABLET ORAL
Status: CANCELLED
Start: 2022-12-28

## 2022-12-27 RX ADMIN — ERTAPENEM SODIUM 1000 MG: 1 INJECTION, POWDER, LYOPHILIZED, FOR SOLUTION INTRAMUSCULAR; INTRAVENOUS at 07:12

## 2022-12-27 RX ADMIN — Medication 300 UNITS: at 07:20

## 2022-12-27 RX ADMIN — SODIUM CHLORIDE, PRESERVATIVE FREE 10 ML: 5 INJECTION INTRAVENOUS at 07:19

## 2022-12-27 NOTE — PROGRESS NOTES
Patient tolerated Invanz administration well. PICC line flushed with good blood return. Vital signs stable. Discharged ambulatory.

## 2022-12-28 ENCOUNTER — HOSPITAL ENCOUNTER (OUTPATIENT)
Dept: INFUSION THERAPY | Age: 52
Setting detail: INFUSION SERIES
Discharge: HOME | End: 2022-12-28
Payer: COMMERCIAL

## 2022-12-28 VITALS
HEART RATE: 78 BPM | OXYGEN SATURATION: 98 % | RESPIRATION RATE: 16 BRPM | SYSTOLIC BLOOD PRESSURE: 121 MMHG | TEMPERATURE: 98 F | DIASTOLIC BLOOD PRESSURE: 74 MMHG

## 2022-12-28 DIAGNOSIS — M00.9 PYOGENIC ARTHRITIS OF LEFT HAND, DUE TO UNSPECIFIED ORGANISM (HCC): Primary | ICD-10-CM

## 2022-12-28 PROCEDURE — 6360000002 HC RX W HCPCS

## 2022-12-28 PROCEDURE — 2580000003 HC RX 258

## 2022-12-28 PROCEDURE — 96374 THER/PROPH/DIAG INJ IV PUSH: CPT

## 2022-12-28 PROCEDURE — A4216 STERILE WATER/SALINE, 10 ML: HCPCS

## 2022-12-28 RX ORDER — HEPARIN SODIUM (PORCINE) LOCK FLUSH IV SOLN 100 UNIT/ML 100 UNIT/ML
300 SOLUTION INTRAVENOUS PRN
Status: CANCELLED | OUTPATIENT
Start: 2022-12-29

## 2022-12-28 RX ORDER — SODIUM CHLORIDE 0.9 % (FLUSH) 0.9 %
5-40 SYRINGE (ML) INJECTION PRN
Status: DISCONTINUED | OUTPATIENT
Start: 2022-12-28 | End: 2022-12-29 | Stop reason: HOSPADM

## 2022-12-28 RX ORDER — EPINEPHRINE 1 MG/ML
0.3 INJECTION, SOLUTION, CONCENTRATE INTRAVENOUS PRN
Status: CANCELLED | OUTPATIENT
Start: 2022-12-29

## 2022-12-28 RX ORDER — HEPARIN SODIUM (PORCINE) LOCK FLUSH IV SOLN 100 UNIT/ML 100 UNIT/ML
300 SOLUTION INTRAVENOUS PRN
Status: DISCONTINUED | OUTPATIENT
Start: 2022-12-28 | End: 2022-12-29 | Stop reason: HOSPADM

## 2022-12-28 RX ORDER — DIPHENHYDRAMINE HCL 25 MG
50 TABLET ORAL
Status: CANCELLED
Start: 2022-12-29

## 2022-12-28 RX ORDER — DIPHENHYDRAMINE HYDROCHLORIDE 50 MG/ML
50 INJECTION INTRAMUSCULAR; INTRAVENOUS
Status: CANCELLED | OUTPATIENT
Start: 2022-12-29

## 2022-12-28 RX ORDER — SODIUM CHLORIDE 0.9 % (FLUSH) 0.9 %
5-40 SYRINGE (ML) INJECTION PRN
Status: CANCELLED | OUTPATIENT
Start: 2022-12-29

## 2022-12-28 RX ADMIN — Medication 300 UNITS: at 07:22

## 2022-12-28 RX ADMIN — ERTAPENEM SODIUM 1000 MG: 1 INJECTION, POWDER, LYOPHILIZED, FOR SOLUTION INTRAMUSCULAR; INTRAVENOUS at 07:15

## 2022-12-28 RX ADMIN — SODIUM CHLORIDE, PRESERVATIVE FREE 10 ML: 5 INJECTION INTRAVENOUS at 07:21

## 2022-12-28 NOTE — PROGRESS NOTES
Tolerated infusion   iv push  medwell.  Reviewed therapy plan, offered education material and/or discharge material, reviewed medication information and signs and symptoms  and educated on possible side effects, verbalizes good knowledge of current plan patient verbalizes understanding, and has no signs or symptoms to report at this time.   Patient discharged. Patient alert and oriented x3.   No distress noted.   Vital signs stable.   Patient denies any new or worsening pain.  Patient denies any needs.  All questions answered.  Next appointment scheduled.  Declines copy of AVS. Patient instructed on s/s infection/complication with midline to report and instructed on keeping midline dressing clean, dry and intact patient verbalizes understanding.

## 2022-12-29 ENCOUNTER — HOSPITAL ENCOUNTER (OUTPATIENT)
Dept: INFUSION THERAPY | Age: 52
Setting detail: INFUSION SERIES
Discharge: HOME | End: 2022-12-29
Payer: COMMERCIAL

## 2022-12-29 VITALS
HEART RATE: 88 BPM | DIASTOLIC BLOOD PRESSURE: 64 MMHG | OXYGEN SATURATION: 99 % | HEIGHT: 67 IN | SYSTOLIC BLOOD PRESSURE: 115 MMHG | WEIGHT: 190 LBS | BODY MASS INDEX: 29.82 KG/M2 | TEMPERATURE: 97.4 F | RESPIRATION RATE: 16 BRPM

## 2022-12-29 DIAGNOSIS — M00.9 PYOGENIC ARTHRITIS OF LEFT HAND, DUE TO UNSPECIFIED ORGANISM (HCC): Primary | ICD-10-CM

## 2022-12-29 LAB
ALBUMIN SERPL-MCNC: 4.1 G/DL (ref 3.5–5.2)
ALP BLD-CCNC: 61 U/L (ref 40–129)
ALT SERPL-CCNC: 62 U/L (ref 0–40)
ANION GAP SERPL CALCULATED.3IONS-SCNC: 11 MMOL/L (ref 7–16)
AST SERPL-CCNC: 34 U/L (ref 0–39)
BASOPHILS ABSOLUTE: 0.04 E9/L (ref 0–0.2)
BASOPHILS RELATIVE PERCENT: 0.9 % (ref 0–2)
BILIRUB SERPL-MCNC: 0.4 MG/DL (ref 0–1.2)
BUN BLDV-MCNC: 12 MG/DL (ref 6–20)
CALCIUM SERPL-MCNC: 9.3 MG/DL (ref 8.6–10.2)
CHLORIDE BLD-SCNC: 103 MMOL/L (ref 98–107)
CO2: 27 MMOL/L (ref 22–29)
CREAT SERPL-MCNC: 1.1 MG/DL (ref 0.7–1.2)
EOSINOPHILS ABSOLUTE: 0.11 E9/L (ref 0.05–0.5)
EOSINOPHILS RELATIVE PERCENT: 2.3 % (ref 0–6)
GFR SERPL CREATININE-BSD FRML MDRD: >60 ML/MIN/1.73
GLUCOSE BLD-MCNC: 91 MG/DL (ref 74–99)
HCT VFR BLD CALC: 41.7 % (ref 37–54)
HEMOGLOBIN: 14.6 G/DL (ref 12.5–16.5)
IMMATURE GRANULOCYTES #: 0.01 E9/L
IMMATURE GRANULOCYTES %: 0.2 % (ref 0–5)
LYMPHOCYTES ABSOLUTE: 1.91 E9/L (ref 1.5–4)
LYMPHOCYTES RELATIVE PERCENT: 40.7 % (ref 20–42)
MCH RBC QN AUTO: 29.9 PG (ref 26–35)
MCHC RBC AUTO-ENTMCNC: 35 % (ref 32–34.5)
MCV RBC AUTO: 85.5 FL (ref 80–99.9)
MONOCYTES ABSOLUTE: 0.41 E9/L (ref 0.1–0.95)
MONOCYTES RELATIVE PERCENT: 8.7 % (ref 2–12)
NEUTROPHILS ABSOLUTE: 2.21 E9/L (ref 1.8–7.3)
NEUTROPHILS RELATIVE PERCENT: 47.2 % (ref 43–80)
PDW BLD-RTO: 11.7 FL (ref 11.5–15)
PLATELET # BLD: 350 E9/L (ref 130–450)
PMV BLD AUTO: 9.5 FL (ref 7–12)
POTASSIUM SERPL-SCNC: 3.6 MMOL/L (ref 3.5–5)
RBC # BLD: 4.88 E12/L (ref 3.8–5.8)
SODIUM BLD-SCNC: 141 MMOL/L (ref 132–146)
TOTAL PROTEIN: 6.7 G/DL (ref 6.4–8.3)
WBC # BLD: 4.7 E9/L (ref 4.5–11.5)

## 2022-12-29 PROCEDURE — 2580000003 HC RX 258

## 2022-12-29 PROCEDURE — 96374 THER/PROPH/DIAG INJ IV PUSH: CPT

## 2022-12-29 PROCEDURE — 6360000002 HC RX W HCPCS

## 2022-12-29 PROCEDURE — 85025 COMPLETE CBC W/AUTO DIFF WBC: CPT

## 2022-12-29 PROCEDURE — A4216 STERILE WATER/SALINE, 10 ML: HCPCS

## 2022-12-29 PROCEDURE — 80053 COMPREHEN METABOLIC PANEL: CPT

## 2022-12-29 RX ORDER — HEPARIN SODIUM (PORCINE) LOCK FLUSH IV SOLN 100 UNIT/ML 100 UNIT/ML
300 SOLUTION INTRAVENOUS PRN
Status: DISCONTINUED | OUTPATIENT
Start: 2022-12-29 | End: 2022-12-30 | Stop reason: HOSPADM

## 2022-12-29 RX ORDER — SODIUM CHLORIDE 0.9 % (FLUSH) 0.9 %
5-40 SYRINGE (ML) INJECTION PRN
Status: DISCONTINUED | OUTPATIENT
Start: 2022-12-29 | End: 2022-12-30 | Stop reason: HOSPADM

## 2022-12-29 RX ORDER — EPINEPHRINE 1 MG/ML
0.3 INJECTION, SOLUTION, CONCENTRATE INTRAVENOUS PRN
Status: CANCELLED | OUTPATIENT
Start: 2022-12-30

## 2022-12-29 RX ORDER — DIPHENHYDRAMINE HYDROCHLORIDE 50 MG/ML
50 INJECTION INTRAMUSCULAR; INTRAVENOUS
Status: CANCELLED | OUTPATIENT
Start: 2022-12-30

## 2022-12-29 RX ORDER — HEPARIN SODIUM (PORCINE) LOCK FLUSH IV SOLN 100 UNIT/ML 100 UNIT/ML
300 SOLUTION INTRAVENOUS PRN
Status: CANCELLED | OUTPATIENT
Start: 2022-12-30

## 2022-12-29 RX ORDER — DIPHENHYDRAMINE HCL 25 MG
50 TABLET ORAL
Status: CANCELLED
Start: 2022-12-30

## 2022-12-29 RX ORDER — SODIUM CHLORIDE 0.9 % (FLUSH) 0.9 %
5-40 SYRINGE (ML) INJECTION PRN
Status: CANCELLED | OUTPATIENT
Start: 2022-12-30

## 2022-12-29 RX ADMIN — ERTAPENEM SODIUM 1000 MG: 1 INJECTION, POWDER, LYOPHILIZED, FOR SOLUTION INTRAMUSCULAR; INTRAVENOUS at 07:23

## 2022-12-29 RX ADMIN — Medication 300 UNITS: at 07:29

## 2022-12-29 RX ADMIN — SODIUM CHLORIDE, PRESERVATIVE FREE 10 ML: 5 INJECTION INTRAVENOUS at 07:22

## 2022-12-29 RX ADMIN — SODIUM CHLORIDE, PRESERVATIVE FREE 10 ML: 5 INJECTION INTRAVENOUS at 07:20

## 2022-12-29 RX ADMIN — SODIUM CHLORIDE, PRESERVATIVE FREE 10 ML: 5 INJECTION INTRAVENOUS at 07:28

## 2022-12-30 ENCOUNTER — HOSPITAL ENCOUNTER (OUTPATIENT)
Dept: INFUSION THERAPY | Age: 52
Setting detail: INFUSION SERIES
Discharge: HOME | End: 2022-12-30
Payer: COMMERCIAL

## 2022-12-30 VITALS
SYSTOLIC BLOOD PRESSURE: 120 MMHG | HEIGHT: 67 IN | BODY MASS INDEX: 29.82 KG/M2 | RESPIRATION RATE: 16 BRPM | OXYGEN SATURATION: 98 % | HEART RATE: 82 BPM | DIASTOLIC BLOOD PRESSURE: 73 MMHG | WEIGHT: 190 LBS | TEMPERATURE: 97.7 F

## 2022-12-30 DIAGNOSIS — M00.9 PYOGENIC ARTHRITIS OF LEFT HAND, DUE TO UNSPECIFIED ORGANISM (HCC): Primary | ICD-10-CM

## 2022-12-30 PROCEDURE — A4216 STERILE WATER/SALINE, 10 ML: HCPCS

## 2022-12-30 PROCEDURE — 96374 THER/PROPH/DIAG INJ IV PUSH: CPT

## 2022-12-30 PROCEDURE — 2580000003 HC RX 258

## 2022-12-30 PROCEDURE — 6360000002 HC RX W HCPCS

## 2022-12-30 RX ORDER — SODIUM CHLORIDE 0.9 % (FLUSH) 0.9 %
5-40 SYRINGE (ML) INJECTION PRN
Status: CANCELLED | OUTPATIENT
Start: 2022-12-31

## 2022-12-30 RX ORDER — EPINEPHRINE 1 MG/ML
0.3 INJECTION, SOLUTION, CONCENTRATE INTRAVENOUS PRN
Status: CANCELLED | OUTPATIENT
Start: 2022-12-31

## 2022-12-30 RX ORDER — HEPARIN SODIUM (PORCINE) LOCK FLUSH IV SOLN 100 UNIT/ML 100 UNIT/ML
300 SOLUTION INTRAVENOUS PRN
Status: DISCONTINUED | OUTPATIENT
Start: 2022-12-30 | End: 2022-12-31 | Stop reason: HOSPADM

## 2022-12-30 RX ORDER — SODIUM CHLORIDE 0.9 % (FLUSH) 0.9 %
5-40 SYRINGE (ML) INJECTION PRN
Status: DISCONTINUED | OUTPATIENT
Start: 2022-12-30 | End: 2022-12-31 | Stop reason: HOSPADM

## 2022-12-30 RX ORDER — HEPARIN SODIUM (PORCINE) LOCK FLUSH IV SOLN 100 UNIT/ML 100 UNIT/ML
300 SOLUTION INTRAVENOUS PRN
Status: CANCELLED | OUTPATIENT
Start: 2022-12-31

## 2022-12-30 RX ORDER — DIPHENHYDRAMINE HCL 25 MG
50 TABLET ORAL
Status: CANCELLED
Start: 2022-12-31

## 2022-12-30 RX ORDER — DIPHENHYDRAMINE HYDROCHLORIDE 50 MG/ML
50 INJECTION INTRAMUSCULAR; INTRAVENOUS
Status: CANCELLED | OUTPATIENT
Start: 2022-12-31

## 2022-12-30 RX ADMIN — ERTAPENEM SODIUM 1000 MG: 1 INJECTION, POWDER, LYOPHILIZED, FOR SOLUTION INTRAMUSCULAR; INTRAVENOUS at 07:19

## 2022-12-30 RX ADMIN — SODIUM CHLORIDE, PRESERVATIVE FREE 10 ML: 5 INJECTION INTRAVENOUS at 07:24

## 2022-12-30 RX ADMIN — SODIUM CHLORIDE, PRESERVATIVE FREE 10 ML: 5 INJECTION INTRAVENOUS at 07:14

## 2022-12-30 RX ADMIN — Medication 300 UNITS: at 07:25

## 2022-12-31 ENCOUNTER — HOSPITAL ENCOUNTER (OUTPATIENT)
Dept: INFUSION THERAPY | Age: 52
Setting detail: INFUSION SERIES
Discharge: HOME | End: 2022-12-31
Payer: COMMERCIAL

## 2022-12-31 VITALS
DIASTOLIC BLOOD PRESSURE: 74 MMHG | TEMPERATURE: 98.4 F | HEART RATE: 94 BPM | OXYGEN SATURATION: 98 % | SYSTOLIC BLOOD PRESSURE: 119 MMHG | RESPIRATION RATE: 16 BRPM

## 2022-12-31 DIAGNOSIS — M00.9 PYOGENIC ARTHRITIS OF LEFT HAND, DUE TO UNSPECIFIED ORGANISM (HCC): Primary | ICD-10-CM

## 2022-12-31 PROCEDURE — 96374 THER/PROPH/DIAG INJ IV PUSH: CPT

## 2022-12-31 PROCEDURE — A4216 STERILE WATER/SALINE, 10 ML: HCPCS

## 2022-12-31 PROCEDURE — 2580000003 HC RX 258

## 2022-12-31 PROCEDURE — 6360000002 HC RX W HCPCS

## 2022-12-31 RX ORDER — HEPARIN SODIUM (PORCINE) LOCK FLUSH IV SOLN 100 UNIT/ML 100 UNIT/ML
300 SOLUTION INTRAVENOUS PRN
Status: CANCELLED | OUTPATIENT
Start: 2023-01-01

## 2022-12-31 RX ORDER — SODIUM CHLORIDE 0.9 % (FLUSH) 0.9 %
5-40 SYRINGE (ML) INJECTION PRN
Status: CANCELLED | OUTPATIENT
Start: 2023-01-01

## 2022-12-31 RX ORDER — EPINEPHRINE 1 MG/ML
0.3 INJECTION, SOLUTION, CONCENTRATE INTRAVENOUS PRN
Status: CANCELLED | OUTPATIENT
Start: 2023-01-01

## 2022-12-31 RX ORDER — HEPARIN SODIUM (PORCINE) LOCK FLUSH IV SOLN 100 UNIT/ML 100 UNIT/ML
300 SOLUTION INTRAVENOUS PRN
Status: DISCONTINUED | OUTPATIENT
Start: 2022-12-31 | End: 2023-01-01 | Stop reason: HOSPADM

## 2022-12-31 RX ORDER — SODIUM CHLORIDE 0.9 % (FLUSH) 0.9 %
5-40 SYRINGE (ML) INJECTION PRN
Status: DISCONTINUED | OUTPATIENT
Start: 2022-12-31 | End: 2023-01-01 | Stop reason: HOSPADM

## 2022-12-31 RX ORDER — DIPHENHYDRAMINE HCL 25 MG
50 TABLET ORAL
Status: CANCELLED
Start: 2023-01-01

## 2022-12-31 RX ORDER — DIPHENHYDRAMINE HYDROCHLORIDE 50 MG/ML
50 INJECTION INTRAMUSCULAR; INTRAVENOUS
Status: CANCELLED | OUTPATIENT
Start: 2023-01-01

## 2022-12-31 RX ADMIN — Medication 300 UNITS: at 08:13

## 2022-12-31 RX ADMIN — SODIUM CHLORIDE, PRESERVATIVE FREE 10 ML: 5 INJECTION INTRAVENOUS at 08:13

## 2022-12-31 RX ADMIN — SODIUM CHLORIDE, PRESERVATIVE FREE 10 ML: 5 INJECTION INTRAVENOUS at 08:03

## 2022-12-31 RX ADMIN — ERTAPENEM SODIUM 1000 MG: 1 INJECTION, POWDER, LYOPHILIZED, FOR SOLUTION INTRAMUSCULAR; INTRAVENOUS at 08:04

## 2022-12-31 ASSESSMENT — PAIN SCALES - GENERAL: PAINLEVEL_OUTOF10: 0

## 2022-12-31 NOTE — PROGRESS NOTES
Patient instructed on s/s infection/complication with PICC line to report and instructed on keeping PICC dressing clean, dry and intact patient verbalizes understanding. Tolerated infusion well. Reviewed therapy plan, offered education material and/or discharge material, reviewed medication information and signs and symptoms  and educated on possible side effects, verbalizes good knowledge of current plan patient verbalizes understanding, and has no signs or symptoms to report at this time. Patient discharged. Patient alert and oriented x3. No distress noted. Vital signs stable. Patient denies any new or worsening pain. Patient denies any needs. All questions answered. Next appointment schedule.   Declined copy of AVS.

## 2023-01-01 ENCOUNTER — HOSPITAL ENCOUNTER (OUTPATIENT)
Dept: INFUSION THERAPY | Age: 53
Setting detail: INFUSION SERIES
Discharge: HOME | End: 2023-01-01
Payer: COMMERCIAL

## 2023-01-01 VITALS
DIASTOLIC BLOOD PRESSURE: 74 MMHG | HEART RATE: 92 BPM | WEIGHT: 190 LBS | BODY MASS INDEX: 29.82 KG/M2 | RESPIRATION RATE: 16 BRPM | HEIGHT: 67 IN | OXYGEN SATURATION: 95 % | TEMPERATURE: 97.4 F | SYSTOLIC BLOOD PRESSURE: 127 MMHG

## 2023-01-01 DIAGNOSIS — M00.9 PYOGENIC ARTHRITIS OF LEFT HAND, DUE TO UNSPECIFIED ORGANISM (HCC): Primary | ICD-10-CM

## 2023-01-01 PROCEDURE — 96374 THER/PROPH/DIAG INJ IV PUSH: CPT

## 2023-01-01 PROCEDURE — 6360000002 HC RX W HCPCS

## 2023-01-01 PROCEDURE — A4216 STERILE WATER/SALINE, 10 ML: HCPCS

## 2023-01-01 PROCEDURE — 2580000003 HC RX 258

## 2023-01-01 RX ORDER — DIPHENHYDRAMINE HCL 25 MG
50 TABLET ORAL
Status: CANCELLED
Start: 2023-01-02

## 2023-01-01 RX ORDER — SODIUM CHLORIDE 0.9 % (FLUSH) 0.9 %
5-40 SYRINGE (ML) INJECTION PRN
Status: DISCONTINUED | OUTPATIENT
Start: 2023-01-01 | End: 2023-01-02 | Stop reason: HOSPADM

## 2023-01-01 RX ORDER — SODIUM CHLORIDE 0.9 % (FLUSH) 0.9 %
5-40 SYRINGE (ML) INJECTION PRN
Status: CANCELLED | OUTPATIENT
Start: 2023-01-02

## 2023-01-01 RX ORDER — DIPHENHYDRAMINE HYDROCHLORIDE 50 MG/ML
50 INJECTION INTRAMUSCULAR; INTRAVENOUS
Status: CANCELLED | OUTPATIENT
Start: 2023-01-02

## 2023-01-01 RX ORDER — HEPARIN SODIUM (PORCINE) LOCK FLUSH IV SOLN 100 UNIT/ML 100 UNIT/ML
300 SOLUTION INTRAVENOUS PRN
Status: DISCONTINUED | OUTPATIENT
Start: 2023-01-01 | End: 2023-01-02 | Stop reason: HOSPADM

## 2023-01-01 RX ORDER — EPINEPHRINE 1 MG/ML
0.3 INJECTION, SOLUTION, CONCENTRATE INTRAVENOUS PRN
Status: CANCELLED | OUTPATIENT
Start: 2023-01-02

## 2023-01-01 RX ORDER — HEPARIN SODIUM (PORCINE) LOCK FLUSH IV SOLN 100 UNIT/ML 100 UNIT/ML
300 SOLUTION INTRAVENOUS PRN
Status: CANCELLED | OUTPATIENT
Start: 2023-01-02

## 2023-01-01 RX ADMIN — SODIUM CHLORIDE, PRESERVATIVE FREE 10 ML: 5 INJECTION INTRAVENOUS at 08:04

## 2023-01-01 RX ADMIN — SODIUM CHLORIDE, PRESERVATIVE FREE 10 ML: 5 INJECTION INTRAVENOUS at 07:58

## 2023-01-01 RX ADMIN — ERTAPENEM SODIUM 1000 MG: 1 INJECTION, POWDER, LYOPHILIZED, FOR SOLUTION INTRAMUSCULAR; INTRAVENOUS at 07:59

## 2023-01-01 RX ADMIN — Medication 300 UNITS: at 08:05

## 2023-01-02 ENCOUNTER — HOSPITAL ENCOUNTER (OUTPATIENT)
Dept: INFUSION THERAPY | Age: 53
Setting detail: INFUSION SERIES
Discharge: HOME | End: 2023-01-02
Payer: COMMERCIAL

## 2023-01-02 VITALS
RESPIRATION RATE: 16 BRPM | HEART RATE: 78 BPM | SYSTOLIC BLOOD PRESSURE: 114 MMHG | TEMPERATURE: 98.1 F | DIASTOLIC BLOOD PRESSURE: 75 MMHG | OXYGEN SATURATION: 96 %

## 2023-01-02 DIAGNOSIS — M00.9 PYOGENIC ARTHRITIS OF LEFT HAND, DUE TO UNSPECIFIED ORGANISM (HCC): Primary | ICD-10-CM

## 2023-01-02 LAB
ALBUMIN SERPL-MCNC: 3.9 G/DL (ref 3.5–5.2)
ALP BLD-CCNC: 53 U/L (ref 40–129)
ALT SERPL-CCNC: 54 U/L (ref 0–40)
ANION GAP SERPL CALCULATED.3IONS-SCNC: 10 MMOL/L (ref 7–16)
AST SERPL-CCNC: 32 U/L (ref 0–39)
BASOPHILS ABSOLUTE: 0.03 E9/L (ref 0–0.2)
BASOPHILS RELATIVE PERCENT: 0.6 % (ref 0–2)
BILIRUB SERPL-MCNC: 0.4 MG/DL (ref 0–1.2)
BUN BLDV-MCNC: 13 MG/DL (ref 6–20)
C-REACTIVE PROTEIN: <0.3 MG/DL (ref 0–0.4)
CALCIUM SERPL-MCNC: 9.2 MG/DL (ref 8.6–10.2)
CHLORIDE BLD-SCNC: 103 MMOL/L (ref 98–107)
CO2: 28 MMOL/L (ref 22–29)
CREAT SERPL-MCNC: 1 MG/DL (ref 0.7–1.2)
EOSINOPHILS ABSOLUTE: 0.16 E9/L (ref 0.05–0.5)
EOSINOPHILS RELATIVE PERCENT: 3.4 % (ref 0–6)
GFR SERPL CREATININE-BSD FRML MDRD: >60 ML/MIN/1.73
GLUCOSE BLD-MCNC: 94 MG/DL (ref 74–99)
HCT VFR BLD CALC: 41.3 % (ref 37–54)
HEMOGLOBIN: 14.3 G/DL (ref 12.5–16.5)
IMMATURE GRANULOCYTES #: 0.02 E9/L
IMMATURE GRANULOCYTES %: 0.4 % (ref 0–5)
LYMPHOCYTES ABSOLUTE: 1.84 E9/L (ref 1.5–4)
LYMPHOCYTES RELATIVE PERCENT: 39.5 % (ref 20–42)
MCH RBC QN AUTO: 30.4 PG (ref 26–35)
MCHC RBC AUTO-ENTMCNC: 34.6 % (ref 32–34.5)
MCV RBC AUTO: 87.9 FL (ref 80–99.9)
MONOCYTES ABSOLUTE: 0.43 E9/L (ref 0.1–0.95)
MONOCYTES RELATIVE PERCENT: 9.2 % (ref 2–12)
NEUTROPHILS ABSOLUTE: 2.18 E9/L (ref 1.8–7.3)
NEUTROPHILS RELATIVE PERCENT: 46.9 % (ref 43–80)
PDW BLD-RTO: 11.9 FL (ref 11.5–15)
PLATELET # BLD: 266 E9/L (ref 130–450)
PMV BLD AUTO: 9.8 FL (ref 7–12)
POTASSIUM SERPL-SCNC: 3.8 MMOL/L (ref 3.5–5)
RBC # BLD: 4.7 E12/L (ref 3.8–5.8)
SEDIMENTATION RATE, ERYTHROCYTE: 4 MM/HR (ref 0–15)
SODIUM BLD-SCNC: 141 MMOL/L (ref 132–146)
TOTAL PROTEIN: 6.5 G/DL (ref 6.4–8.3)
WBC # BLD: 4.7 E9/L (ref 4.5–11.5)

## 2023-01-02 PROCEDURE — 2580000003 HC RX 258

## 2023-01-02 PROCEDURE — 36415 COLL VENOUS BLD VENIPUNCTURE: CPT

## 2023-01-02 PROCEDURE — A4216 STERILE WATER/SALINE, 10 ML: HCPCS

## 2023-01-02 PROCEDURE — 96374 THER/PROPH/DIAG INJ IV PUSH: CPT

## 2023-01-02 PROCEDURE — 86140 C-REACTIVE PROTEIN: CPT

## 2023-01-02 PROCEDURE — 85025 COMPLETE CBC W/AUTO DIFF WBC: CPT

## 2023-01-02 PROCEDURE — 85651 RBC SED RATE NONAUTOMATED: CPT

## 2023-01-02 PROCEDURE — 80053 COMPREHEN METABOLIC PANEL: CPT

## 2023-01-02 PROCEDURE — 6360000002 HC RX W HCPCS

## 2023-01-02 RX ORDER — HEPARIN SODIUM (PORCINE) LOCK FLUSH IV SOLN 100 UNIT/ML 100 UNIT/ML
300 SOLUTION INTRAVENOUS PRN
Status: DISCONTINUED | OUTPATIENT
Start: 2023-01-02 | End: 2023-01-03 | Stop reason: HOSPADM

## 2023-01-02 RX ORDER — SODIUM CHLORIDE 0.9 % (FLUSH) 0.9 %
5-40 SYRINGE (ML) INJECTION PRN
Status: DISCONTINUED | OUTPATIENT
Start: 2023-01-02 | End: 2023-01-03 | Stop reason: HOSPADM

## 2023-01-02 RX ORDER — EPINEPHRINE 1 MG/ML
0.3 INJECTION, SOLUTION, CONCENTRATE INTRAVENOUS PRN
Status: CANCELLED | OUTPATIENT
Start: 2023-01-03

## 2023-01-02 RX ORDER — HEPARIN SODIUM (PORCINE) LOCK FLUSH IV SOLN 100 UNIT/ML 100 UNIT/ML
300 SOLUTION INTRAVENOUS PRN
Status: CANCELLED | OUTPATIENT
Start: 2023-01-03

## 2023-01-02 RX ORDER — SODIUM CHLORIDE 0.9 % (FLUSH) 0.9 %
5-40 SYRINGE (ML) INJECTION PRN
Status: CANCELLED | OUTPATIENT
Start: 2023-01-03

## 2023-01-02 RX ORDER — DIPHENHYDRAMINE HCL 25 MG
50 TABLET ORAL
Status: CANCELLED
Start: 2023-01-03

## 2023-01-02 RX ORDER — DIPHENHYDRAMINE HYDROCHLORIDE 50 MG/ML
50 INJECTION INTRAMUSCULAR; INTRAVENOUS
Status: CANCELLED | OUTPATIENT
Start: 2023-01-03

## 2023-01-02 RX ADMIN — SODIUM CHLORIDE, PRESERVATIVE FREE 10 ML: 5 INJECTION INTRAVENOUS at 08:29

## 2023-01-02 RX ADMIN — Medication 300 UNITS: at 08:29

## 2023-01-02 RX ADMIN — SODIUM CHLORIDE, PRESERVATIVE FREE 10 ML: 5 INJECTION INTRAVENOUS at 08:12

## 2023-01-02 RX ADMIN — ERTAPENEM SODIUM 1000 MG: 1 INJECTION, POWDER, LYOPHILIZED, FOR SOLUTION INTRAMUSCULAR; INTRAVENOUS at 08:16

## 2023-01-02 RX ADMIN — SODIUM CHLORIDE, PRESERVATIVE FREE 10 ML: 5 INJECTION INTRAVENOUS at 08:10

## 2023-01-02 NOTE — PROGRESS NOTES
Tolerated infusion  iv push med well. Reviewed therapy plan, offered education material and/or discharge material, reviewed medication information and signs and symptoms  and educated on possible side effects, verbalizes good knowledge of current plan patient verbalizes understanding, and has no signs or symptoms to report at this time. Patient discharged. Patient alert and oriented x3. No distress noted. Vital signs stable. Patient denies any new or worsening pain. Patient denies any needs. A ll questions answered. Next appointment scheduled. Declines  copy of AVS. Patient instructed on s/s infection/complication with midline to report and instructed on keeping   picc line dressing clean, dry and intact patient verbalizes understanding.

## 2023-01-03 ENCOUNTER — HOSPITAL ENCOUNTER (OUTPATIENT)
Dept: INFUSION THERAPY | Age: 53
Setting detail: INFUSION SERIES
Discharge: HOME | End: 2023-01-03
Payer: COMMERCIAL

## 2023-01-03 VITALS
WEIGHT: 190 LBS | SYSTOLIC BLOOD PRESSURE: 122 MMHG | RESPIRATION RATE: 19 BRPM | DIASTOLIC BLOOD PRESSURE: 70 MMHG | BODY MASS INDEX: 29.82 KG/M2 | TEMPERATURE: 97.8 F | HEIGHT: 67 IN | OXYGEN SATURATION: 98 % | HEART RATE: 78 BPM

## 2023-01-03 DIAGNOSIS — M00.9 PYOGENIC ARTHRITIS OF LEFT HAND, DUE TO UNSPECIFIED ORGANISM (HCC): Primary | ICD-10-CM

## 2023-01-03 PROCEDURE — 6360000002 HC RX W HCPCS

## 2023-01-03 PROCEDURE — A4216 STERILE WATER/SALINE, 10 ML: HCPCS

## 2023-01-03 PROCEDURE — 96374 THER/PROPH/DIAG INJ IV PUSH: CPT

## 2023-01-03 PROCEDURE — 2580000003 HC RX 258

## 2023-01-03 RX ORDER — DIPHENHYDRAMINE HCL 25 MG
50 TABLET ORAL
Status: CANCELLED
Start: 2023-01-04

## 2023-01-03 RX ORDER — SODIUM CHLORIDE 0.9 % (FLUSH) 0.9 %
5-40 SYRINGE (ML) INJECTION PRN
Status: DISCONTINUED | OUTPATIENT
Start: 2023-01-03 | End: 2023-01-04 | Stop reason: HOSPADM

## 2023-01-03 RX ORDER — HEPARIN SODIUM (PORCINE) LOCK FLUSH IV SOLN 100 UNIT/ML 100 UNIT/ML
300 SOLUTION INTRAVENOUS PRN
Status: DISCONTINUED | OUTPATIENT
Start: 2023-01-03 | End: 2023-01-04 | Stop reason: HOSPADM

## 2023-01-03 RX ORDER — HEPARIN SODIUM (PORCINE) LOCK FLUSH IV SOLN 100 UNIT/ML 100 UNIT/ML
300 SOLUTION INTRAVENOUS PRN
Status: CANCELLED | OUTPATIENT
Start: 2023-01-04

## 2023-01-03 RX ORDER — SODIUM CHLORIDE 0.9 % (FLUSH) 0.9 %
5-40 SYRINGE (ML) INJECTION PRN
Status: CANCELLED | OUTPATIENT
Start: 2023-01-04

## 2023-01-03 RX ORDER — EPINEPHRINE 1 MG/ML
0.3 INJECTION, SOLUTION, CONCENTRATE INTRAVENOUS PRN
Status: CANCELLED | OUTPATIENT
Start: 2023-01-04

## 2023-01-03 RX ORDER — DIPHENHYDRAMINE HYDROCHLORIDE 50 MG/ML
50 INJECTION INTRAMUSCULAR; INTRAVENOUS
Status: CANCELLED | OUTPATIENT
Start: 2023-01-04

## 2023-01-03 RX ADMIN — SODIUM CHLORIDE, PRESERVATIVE FREE 10 ML: 5 INJECTION INTRAVENOUS at 07:15

## 2023-01-03 RX ADMIN — ERTAPENEM SODIUM 1000 MG: 1 INJECTION, POWDER, LYOPHILIZED, FOR SOLUTION INTRAMUSCULAR; INTRAVENOUS at 07:16

## 2023-01-03 RX ADMIN — SODIUM CHLORIDE, PRESERVATIVE FREE 10 ML: 5 INJECTION INTRAVENOUS at 07:21

## 2023-01-03 RX ADMIN — Medication 300 UNITS: at 07:22

## 2023-01-04 ENCOUNTER — HOSPITAL ENCOUNTER (OUTPATIENT)
Dept: INFUSION THERAPY | Age: 53
Setting detail: INFUSION SERIES
Discharge: HOME | End: 2023-01-04
Payer: COMMERCIAL

## 2023-01-04 VITALS
HEART RATE: 70 BPM | DIASTOLIC BLOOD PRESSURE: 73 MMHG | TEMPERATURE: 96.9 F | SYSTOLIC BLOOD PRESSURE: 114 MMHG | OXYGEN SATURATION: 96 %

## 2023-01-04 DIAGNOSIS — M00.9 PYOGENIC ARTHRITIS OF LEFT HAND, DUE TO UNSPECIFIED ORGANISM (HCC): Primary | ICD-10-CM

## 2023-01-04 PROCEDURE — 2580000003 HC RX 258

## 2023-01-04 PROCEDURE — A4216 STERILE WATER/SALINE, 10 ML: HCPCS

## 2023-01-04 PROCEDURE — 6360000002 HC RX W HCPCS

## 2023-01-04 PROCEDURE — 96374 THER/PROPH/DIAG INJ IV PUSH: CPT

## 2023-01-04 RX ORDER — SODIUM CHLORIDE 0.9 % (FLUSH) 0.9 %
5-40 SYRINGE (ML) INJECTION PRN
Status: DISCONTINUED | OUTPATIENT
Start: 2023-01-04 | End: 2023-01-05 | Stop reason: HOSPADM

## 2023-01-04 RX ORDER — DIPHENHYDRAMINE HYDROCHLORIDE 50 MG/ML
50 INJECTION INTRAMUSCULAR; INTRAVENOUS
Status: CANCELLED | OUTPATIENT
Start: 2023-01-05

## 2023-01-04 RX ORDER — DIPHENHYDRAMINE HCL 25 MG
50 TABLET ORAL
Status: CANCELLED
Start: 2023-01-05

## 2023-01-04 RX ORDER — SODIUM CHLORIDE 0.9 % (FLUSH) 0.9 %
5-40 SYRINGE (ML) INJECTION PRN
Status: CANCELLED | OUTPATIENT
Start: 2023-01-05

## 2023-01-04 RX ORDER — EPINEPHRINE 1 MG/ML
0.3 INJECTION, SOLUTION, CONCENTRATE INTRAVENOUS PRN
Status: CANCELLED | OUTPATIENT
Start: 2023-01-05

## 2023-01-04 RX ORDER — HEPARIN SODIUM (PORCINE) LOCK FLUSH IV SOLN 100 UNIT/ML 100 UNIT/ML
300 SOLUTION INTRAVENOUS PRN
Status: DISCONTINUED | OUTPATIENT
Start: 2023-01-04 | End: 2023-01-05 | Stop reason: HOSPADM

## 2023-01-04 RX ORDER — HEPARIN SODIUM (PORCINE) LOCK FLUSH IV SOLN 100 UNIT/ML 100 UNIT/ML
300 SOLUTION INTRAVENOUS PRN
Status: CANCELLED | OUTPATIENT
Start: 2023-01-05

## 2023-01-04 RX ADMIN — SODIUM CHLORIDE 1000 MG: 9 INJECTION INTRAMUSCULAR; INTRAVENOUS; SUBCUTANEOUS at 07:19

## 2023-01-04 RX ADMIN — SODIUM CHLORIDE, PRESERVATIVE FREE 10 ML: 5 INJECTION INTRAVENOUS at 07:24

## 2023-01-04 RX ADMIN — Medication 300 UNITS: at 07:25

## 2023-01-04 ASSESSMENT — PAIN DESCRIPTION - PAIN TYPE: TYPE: SURGICAL PAIN

## 2023-01-04 ASSESSMENT — PAIN SCALES - GENERAL: PAINLEVEL_OUTOF10: 0

## 2023-01-04 NOTE — PROGRESS NOTES
Tolerated infusion  iv push  [facility]. Reviewed therapy plan, offered education material and/or discharge material, reviewed medication information and signs and symptoms  and educated on possible side effects, verbalizes good knowledge of current plan patient verbalizes understanding, and has no signs or symptoms to report at this time. Patient discharged. Patient alert and oriented x3. No distress noted. Vital signs stable. Patient denies any new or worsening pain. Patient denies any needs. All questions answered. Next appointment scheduled.

## 2023-01-05 ENCOUNTER — HOSPITAL ENCOUNTER (OUTPATIENT)
Dept: INFUSION THERAPY | Age: 53
Setting detail: INFUSION SERIES
End: 2023-01-05

## 2023-01-11 ENCOUNTER — TELEPHONE (OUTPATIENT)
Dept: OCCUPATIONAL THERAPY | Age: 53
End: 2023-01-11

## 2023-01-30 ENCOUNTER — OFFICE VISIT (OUTPATIENT)
Dept: ORTHOPEDIC SURGERY | Age: 53
End: 2023-01-30
Payer: COMMERCIAL

## 2023-01-30 VITALS — HEIGHT: 67 IN | WEIGHT: 190 LBS | BODY MASS INDEX: 29.82 KG/M2
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
## 2023-01-30 DIAGNOSIS — S61.452A DOG BITE, HAND, LEFT, INITIAL ENCOUNTER: Primary | ICD-10-CM

## 2023-01-30 PROCEDURE — 99212 OFFICE O/P EST SF 10 MIN: CPT | Performed by: ORTHOPAEDIC SURGERY

## 2023-01-30 NOTE — PROGRESS NOTES
Chief Complaint   Patient presents with    Post-Op Check     8 weeks out, Lt hand I&D         Yan Silva is a 50y.o. year old  who presents for follow up of proximally 7 weeks out right hand I&D for dog bite with injury of the metacarpal head. States overall he is doing well. He is finished IV antibiotics. Fevers or chills. Only complaint is some stiffness in the morning. States he has a stress ball on does home exercises. Able to tolerate weightlifting. States that this is actually improved. Range of motion. Denies any pain. Past Medical History:   Diagnosis Date    Cellulitis 12/05/2022    Dog bite of left hand 12/03/2022    Fracture of base of third metacarpal bone 12/03/2022    Dog bite    Septic arthritis of hand, left ([facility] [address] 12/7/2022 12/6/2020.      Past Surgical History:   Procedure Laterality Date    BICEPS TENDON REPAIR      HAND SURGERY Left 12/7/2022    LEFT HAND  INCISION AND DEBRIDEMENT performed by Edwige Chino MD at [address] [place] [address]  12/9/2022       Current Outpatient Medications:     ibuprofen (ADVIL;MOTRIN) 200 MG tablet, Take 200 mg by mouth every 6 hours as needed for Pain, Disp: , Rfl:   No Known Allergies  Social History     Socioeconomic History    Marital status:      Spouse name: Not on file    Number of children: Not on file    Years of education: Not on file    Highest education level: Not on file   Occupational History    Not on file   Tobacco Use    Smoking status: Some Days     Types: Cigars    Smokeless tobacco: Never   Vaping Use    Vaping Use: Never used   Substance and Sexual Activity    Alcohol use: Yes    Drug use: No    Sexual activity: Not on file   Other Topics Concern    Not on file   Social History Narrative    Not on file     Social Determinants of Health     Financial Resource Strain: Not on file   Food Insecurity: Not on file   Transportation Needs: Not on file   Physical Activity: Not on file   Stress: Not on file Social Connections: Not on file   Intimate Partner Violence: Not on file   Housing Stability: Not on file     Family History   Problem Relation Age of Onset    Other Mother          age 59 cerebral aneurysm    Other Father         Alive , severe arthritis prior alcoholic       Skin: (-) rash,(-) psoriasis,(-) eczema, (-)skin cancer. Musculoskeletal: (-) fractures,  (-) dislocations,(-) collagen vascular disease, (-) fibromyalgia, (-) multiple sclerosis, (-) muscular dystrophy, (-) RSD,(-) joint pain (-)swelling, (-) joint pain,swelling. Neurologic: (-) epilepsy, (-)seizures,(-) brain tumor,(-) TIA, (-)stroke, (-)headaches, (-)Parkinson disease,(-) memory loss, (-) LOC. Cardiovascular: (-) Chest pain, (-) swelling in legs/feet, (-) SOB, (-) cramping in legs/feet with walking. SUBJECTIVE:      Constitutional:    The patient is alert and oriented x 3, appears to be stated age and in no distress. Physical exam.  Incision well-healed. No erythema. There is trace soft tissue edema surrounding the middle finger MCP joint. He has full digital extension. Nearly full composite flexion. The residual signs of infection present. Radiology review: X-rays of the left hand were ordered, obtained and reviewed in office today. 3 views left hand reviewed with the patient. These demonstrate no acute fractures or dislocations. No other bony or soft tissue abnormalities  Impression: No acute findings      Radiographic findings reviewed with patient      Impression:   Encounter Diagnosis   Name Primary? Dog bite, hand, left, initial encounter Yes       Assessment. 7 weeks postop left MCP I&D for dog bite injury. Plan:  Continue home exercises as needed. Cocoa butter or vitamin E for scar care. Can be weightbearing as tolerated on the extremity. Can follow-up as needed. Informed patient that scar tissue will continue to decrease over the coming months.

## 2025-05-11 ENCOUNTER — APPOINTMENT (OUTPATIENT)
Dept: GENERAL RADIOLOGY | Age: 55
End: 2025-05-11
Payer: COMMERCIAL

## 2025-05-11 ENCOUNTER — HOSPITAL ENCOUNTER (EMERGENCY)
Age: 55
Discharge: HOME | End: 2025-05-12
Payer: COMMERCIAL

## 2025-05-11 VITALS
TEMPERATURE: 98.1 F | RESPIRATION RATE: 15 BRPM | DIASTOLIC BLOOD PRESSURE: 82 MMHG | HEART RATE: 87 BPM | BODY MASS INDEX: 29.82 KG/M2 | HEIGHT: 67 IN | OXYGEN SATURATION: 99 % | WEIGHT: 190 LBS | SYSTOLIC BLOOD PRESSURE: 130 MMHG
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
## 2025-05-11 DIAGNOSIS — S51.851A DOG BITE OF FOREARM WITHOUT COMPLICATION, RIGHT, INITIAL ENCOUNTER: Primary | ICD-10-CM

## 2025-05-11 PROCEDURE — 73090 X-RAY EXAM OF FOREARM: CPT

## 2025-05-11 PROCEDURE — 99283 EMERGENCY DEPT VISIT LOW MDM: CPT

## 2025-05-11 PROCEDURE — 6370000000 HC RX 637 (ALT 250 FOR IP): Performed by: PHYSICIAN ASSISTANT

## 2025-05-11 RX ORDER — FINASTERIDE 5 MG/1
5 TABLET, FILM COATED ORAL DAILY
COMMUNITY

## 2025-05-11 RX ADMIN — AMOXICILLIN AND CLAVULANATE POTASSIUM 1 TABLET: 875; 125 TABLET, FILM COATED ORAL at 23:16

## 2025-05-11 ASSESSMENT — PAIN - FUNCTIONAL ASSESSMENT: PAIN_FUNCTIONAL_ASSESSMENT: 0-10

## 2025-05-11 ASSESSMENT — PAIN DESCRIPTION - ORIENTATION: ORIENTATION: RIGHT

## 2025-05-11 ASSESSMENT — PAIN SCALES - GENERAL: PAINLEVEL_OUTOF10: 2

## 2025-05-11 ASSESSMENT — PAIN DESCRIPTION - LOCATION: LOCATION: WRIST

## 2025-05-12 PROCEDURE — 12001 RPR S/N/AX/GEN/TRNK 2.5CM/<: CPT

## 2025-05-12 NOTE — ED PROVIDER NOTES
Independent RUBY Visit.      [facility]  Department of Emergency Medicine   ED  Encounter Note  Admit Date/RoomTime: 2025 10:40 PM  ED Room: DIPIKA/DIPIKA    NAME: Bud Salvador  : 1970  MRN: [id number]     Chief Complaint:  Animal Bite (Dog UTD on shots)    History of Present Illness        Bud Salvador is a 54 y.o. old male presenting to the emergency department by private vehicle, for a dog bite to right forearm, with laceration and multiple lacerations which occured 1 hour(s) prior to arrival.  Since onset the symptoms have been persistent.   Tetanus Status:  within past 10 years. 2022  Pt reports he was breaking up a fight between 2 dogs, belonging to his friends, while at a cookout.  Dogs are up to date on their vaccinations.    ROS   Pertinent positives and negatives are stated within HPI, all other systems reviewed and are negative.    Past Medical History:  has a past medical history of Cellulitis, Dog bite of left hand, Fracture of base of third metacarpal bone, and Septic arthritis of hand, left (HCC).    Surgical History:  has a past surgical history that includes Biceps tendon repair; Hand surgery (Left, 2022); and picc line insertion nurse (2022).    Social History:  reports that he has been smoking cigars. He has never used smokeless tobacco. He reports current alcohol use. He reports that he does not use drugs.    Family History: family history includes Other in his father and mother.     Allergies: Patient has no known allergies.    Physical Exam   Oxygen Saturation Interpretation: Normal.        ED Triage Vitals   BP Systolic BP Percentile Diastolic BP Percentile Temp Temp Source Pulse Respirations SpO2   25 -- -- 25   130/82   98.1 °F (36.7 °C) Oral 94 14 96 %      Height Weight - Scale         25         1.702 m (5' 7\") 86.2 kg (190 lb)

## (undated) DEVICE — CRADLE ARM W8.75XH12.5XL16IN FOAM SUPP ELEVATION VENT

## (undated) DEVICE — CLOTH SURG PREP PREOPERATIVE CHLORHEXIDINE GLUC 2% READYPREP

## (undated) DEVICE — CONNECTOR TBNG Y 6IN 1 PLAS LTWT

## (undated) DEVICE — 4-PORT MANIFOLD: Brand: NEPTUNE 2

## (undated) DEVICE — SPLINT CAST 4INX15FT ORTHO GLS

## (undated) DEVICE — GLOVE ORANGE PI 8 1/2   MSG9085

## (undated) DEVICE — INTENDED FOR TISSUE SEPARATION, AND OTHER PROCEDURES THAT REQUIRE A SHARP SURGICAL BLADE TO PUNCTURE OR CUT.: Brand: BARD-PARKER ® STAINLESS STEEL BLADES

## (undated) DEVICE — PADDING 4YDX2IN COTTON NONSTER WEBRIL

## (undated) DEVICE — GLOVE SURG SZ 65 L12IN FNGR THK79MIL GRN LTX FREE

## (undated) DEVICE — DOUBLE BASIN SET: Brand: MEDLINE INDUSTRIES, INC.

## (undated) DEVICE — COVER HNDL LT DISP

## (undated) DEVICE — GLOVE SURG SZ 6 THK91MIL LTX FREE SYN POLYISOPRENE ANTI

## (undated) DEVICE — SURGICAL PROCEDURE PACK HND

## (undated) DEVICE — SET IRR L100IN DIA0.280IN C100ML 2 NVENT PIERCING PINS 3

## (undated) DEVICE — PADDING,UNDERCAST,COTTON, 4"X4YD STERILE: Brand: MEDLINE

## (undated) DEVICE — ZIMMER® STERILE DISPOSABLE TOURNIQUET CUFF WITH PLC, DUAL PORT, SINGLE BLADDER, 18 IN. (46 CM)

## (undated) DEVICE — TRAP,MUCUS SPECIMEN, 80CC: Brand: MEDLINE

## (undated) DEVICE — BANDAGE COMPR ELASTIC 4 IN STRL ACE